# Patient Record
Sex: MALE | Race: WHITE | NOT HISPANIC OR LATINO | Employment: OTHER | URBAN - METROPOLITAN AREA
[De-identification: names, ages, dates, MRNs, and addresses within clinical notes are randomized per-mention and may not be internally consistent; named-entity substitution may affect disease eponyms.]

---

## 2017-09-22 ENCOUNTER — HOSPITAL ENCOUNTER (EMERGENCY)
Facility: HOSPITAL | Age: 47
Discharge: HOME | End: 2017-09-22
Attending: EMERGENCY MEDICINE | Admitting: EMERGENCY MEDICINE

## 2017-09-22 VITALS
WEIGHT: 200 LBS | OXYGEN SATURATION: 98 % | DIASTOLIC BLOOD PRESSURE: 69 MMHG | TEMPERATURE: 96.8 F | RESPIRATION RATE: 16 BRPM | SYSTOLIC BLOOD PRESSURE: 113 MMHG | BODY MASS INDEX: 27.09 KG/M2 | HEIGHT: 72 IN | HEART RATE: 69 BPM

## 2017-09-22 DIAGNOSIS — R51.9 ACUTE NONINTRACTABLE HEADACHE, UNSPECIFIED HEADACHE TYPE: Primary | ICD-10-CM

## 2017-09-22 PROCEDURE — 25010000002 KETOROLAC TROMETHAMINE PER 15 MG: Performed by: NURSE PRACTITIONER

## 2017-09-22 PROCEDURE — 99283 EMERGENCY DEPT VISIT LOW MDM: CPT

## 2017-09-22 PROCEDURE — 96361 HYDRATE IV INFUSION ADD-ON: CPT

## 2017-09-22 PROCEDURE — 96374 THER/PROPH/DIAG INJ IV PUSH: CPT

## 2017-09-22 PROCEDURE — 25010000002 ONDANSETRON PER 1 MG: Performed by: NURSE PRACTITIONER

## 2017-09-22 PROCEDURE — 96375 TX/PRO/DX INJ NEW DRUG ADDON: CPT

## 2017-09-22 RX ORDER — PROMETHAZINE HYDROCHLORIDE 25 MG/1
25 TABLET ORAL EVERY 6 HOURS PRN
Qty: 12 TABLET | Refills: 0 | Status: SHIPPED | OUTPATIENT
Start: 2017-09-22 | End: 2018-05-25

## 2017-09-22 RX ORDER — NAPROXEN 500 MG/1
500 TABLET ORAL 2 TIMES DAILY PRN
Qty: 14 TABLET | Refills: 0 | Status: SHIPPED | OUTPATIENT
Start: 2017-09-22 | End: 2018-05-17 | Stop reason: HOSPADM

## 2017-09-22 RX ORDER — PROMETHAZINE HYDROCHLORIDE 25 MG/ML
12.5 INJECTION, SOLUTION INTRAMUSCULAR; INTRAVENOUS ONCE
Status: DISCONTINUED | OUTPATIENT
Start: 2017-09-22 | End: 2017-09-22

## 2017-09-22 RX ORDER — KETOROLAC TROMETHAMINE 15 MG/ML
15 INJECTION, SOLUTION INTRAMUSCULAR; INTRAVENOUS ONCE
Status: COMPLETED | OUTPATIENT
Start: 2017-09-22 | End: 2017-09-22

## 2017-09-22 RX ORDER — ONDANSETRON 4 MG/1
4 TABLET, FILM COATED ORAL EVERY 6 HOURS PRN
Qty: 12 TABLET | Refills: 0 | Status: SHIPPED | OUTPATIENT
Start: 2017-09-22 | End: 2018-05-25

## 2017-09-22 RX ORDER — ONDANSETRON 2 MG/ML
4 INJECTION INTRAMUSCULAR; INTRAVENOUS ONCE
Status: COMPLETED | OUTPATIENT
Start: 2017-09-22 | End: 2017-09-22

## 2017-09-22 RX ADMIN — ONDANSETRON 4 MG: 2 INJECTION INTRAMUSCULAR; INTRAVENOUS at 16:19

## 2017-09-22 RX ADMIN — SODIUM CHLORIDE 1000 ML: 9 INJECTION, SOLUTION INTRAVENOUS at 16:17

## 2017-09-22 RX ADMIN — KETOROLAC TROMETHAMINE 15 MG: 15 INJECTION, SOLUTION INTRAMUSCULAR; INTRAVENOUS at 16:22

## 2017-09-22 NOTE — ED PROVIDER NOTES
EMERGENCY DEPARTMENT ENCOUNTER    CHIEF COMPLAINT  Chief Complaint: HA  History given by: pt   History limited by: none   Room Number: 03/03  PMD: No Known Provider      HPI:  Pt is a 46 y.o. male who presents with temporal HA that radiated to his anterior head for the past 2 weeks. Pt states that his HA now includes his posterior head as well. Pt also c/o minor nausea while in the waiting room today, and earache for several days. Pt denies vomiting, congestion, confusion, numbness, tingling. Pt states that he has had a high stress level recently. Pt states that he was seen at [facility] and had a CT head with no findings, and benadryl for his HA. Pt states that his b/p has been high recently. Pt denies any changes in his caffeine intake or stimulant use. Pt states a Hx of intermittent HA that spontaneously resolved.     Duration: 2 weeks   Timing: constant   Location: temporal   Radiation: anterior head, posterior head  Quality: pain  Intensity/Severity: moderate   Progression: unchanged   Associated Symptoms: minor nausea, earache  Aggravating Factors: none stated   Alleviating Factors: none stated   Previous Episodes: Hx of intermittent HA  Treatment before arrival: benadryl without relief     PAST MEDICAL HISTORY  Active Ambulatory Problems     Diagnosis Date Noted   • No Active Ambulatory Problems     Resolved Ambulatory Problems     Diagnosis Date Noted   • No Resolved Ambulatory Problems     No Additional Past Medical History       PAST SURGICAL HISTORY  Past Surgical History:   Procedure Laterality Date   • BACK SURGERY         FAMILY HISTORY  History reviewed. No pertinent family history.    SOCIAL HISTORY  Social History     Social History   • Marital status: Single     Spouse name: N/A   • Number of children: N/A   • Years of education: N/A     Occupational History   • Not on file.     Social History Main Topics   • Smoking status: Never Smoker   • Smokeless tobacco: Current User     Types: Chew   • Alcohol  use No   • Drug use: No   • Sexual activity: Defer     Other Topics Concern   • Not on file     Social History Narrative   • No narrative on file         ALLERGIES  Review of patient's allergies indicates no known allergies.    REVIEW OF SYSTEMS  Review of Systems   Constitutional: Negative.  Negative for activity change, appetite change ( decreased), chills and fever.   HENT: Positive for ear pain (several days). Negative for congestion, rhinorrhea, sinus pressure and sore throat.    Eyes: Negative.    Respiratory: Negative.  Negative for cough and shortness of breath.    Cardiovascular: Negative.  Negative for chest pain, palpitations and leg swelling ( pedal).   Gastrointestinal: Positive for nausea (minor, since this afternoon). Negative for abdominal pain, diarrhea and vomiting.   Endocrine: Negative.    Genitourinary: Negative.  Negative for decreased urine volume, difficulty urinating, dysuria, frequency and urgency.   Musculoskeletal: Negative.  Negative for back pain.   Skin: Negative.  Negative for rash.   Allergic/Immunologic: Negative.    Neurological: Positive for headaches. Negative for dizziness, weakness, light-headedness and numbness.   Hematological: Negative.    Psychiatric/Behavioral: Negative.  The patient is not nervous/anxious.    All other systems reviewed and are negative.      PHYSICAL EXAM  ED Triage Vitals   Temp Heart Rate Resp BP SpO2   09/22/17 1137 09/22/17 1137 09/22/17 1139 09/22/17 1215 09/22/17 1137   96.8 °F (36 °C) 72 16 146/75 98 %      Temp src Heart Rate Source Patient Position BP Location FiO2 (%)   09/22/17 1137 09/22/17 1137 09/22/17 1215 09/22/17 1215 --   Tympanic Monitor Sitting Left arm        Physical Exam   Constitutional: He is oriented to person, place, and time and well-developed, well-nourished, and in no distress. No distress.   HENT:   Head: Normocephalic.   Mouth/Throat: Oropharynx is clear and moist and mucous membranes are normal.   Eyes: Pupils are equal,  "round, and reactive to light.   Neck: Normal range of motion.   Cardiovascular: Normal rate, regular rhythm and normal heart sounds.    Pulmonary/Chest: Effort normal and breath sounds normal. He has no wheezes.   Abdominal: Soft. Bowel sounds are normal. There is no tenderness.   Musculoskeletal: Normal range of motion. He exhibits no edema.   Neurological: He is alert and oriented to person, place, and time. He has normal sensation and normal strength.   Skin: Skin is warm and dry. No rash noted.   Psychiatric: Mood, memory, affect and judgment normal.   Nursing note and vitals reviewed.      PROGRESS AND CONSULTS  1655  Reviewed pt's history and workup with Dr. Thomason.  After a bedside evaluation; Dr Thomason agrees with the plan of care.    1737  Rechecked pt, who is resting comfortably, and states that he is feeling better. Plan to d/c the pt with meds for pain to take as needed for HA. Advised the pt not to drive on narcotic pain meds. Pt understands and agrees with the plan, ad all questions were answered.     MEDICATIONS GIVEN IN ER  Medications   ketorolac (TORADOL) injection 15 mg (15 mg Intravenous Given 9/22/17 1622)   sodium chloride 0.9 % bolus 1,000 mL (1,000 mL Intravenous New Bag 9/22/17 1617)   ondansetron (ZOFRAN) injection 4 mg (4 mg Intravenous Given 9/22/17 1619)       /69  Pulse 69  Temp 96.8 °F (36 °C) (Tympanic)   Resp 16  Ht 72\" (182.9 cm)  Wt 200 lb (90.7 kg)  SpO2 98%  BMI 27.12 kg/m2    DISCHARGE  Discussed all results and noted any abnormalities with patient.  Discussed absoute need to recheck abnormalities with [facility].    Reviewed implications of results, diagnosis, meds, responsibility to follow up, warning signs and symptoms of possible worsening, potential complications and reasons to return to ER with patient    Discussed plan for discharge, as there is no emergent indication for admission.  Pt is agreeable and understands need for follow up and repeat " testing.  Pt is aware that discharge does not mean that nothing is wrong but it indicates no emergency is present and they must continue care with [facility].  Pt is discharged with instructions to follow up with primary care doctor to have their blood pressure rechecked.       DIAGNOSIS  Final diagnoses:   Acute nonintractable headache, unspecified headache type       FOLLOW UP   [facility]UR[facility]  [address]  [facility] [address] [phone]  Call in 1 day  As needed      RX     Medication List      New Prescriptions          naproxen 500 MG tablet   Commonly known as:  NAPROSYN   Take 1 tablet by mouth 2 (Two) Times a Day As Needed for Headache.       ondansetron 4 MG tablet   Commonly known as:  ZOFRAN   Take 1 tablet by mouth Every 6 (Six) Hours As Needed for Nausea or   Vomiting.       promethazine 25 MG tablet   Commonly known as:  PHENERGAN   Take 1 tablet by mouth Every 6 (Six) Hours As Needed for Nausea or   Vomiting (do not drive on medication).           I personally reviewed the past medical history, past surgical history, social history, family history, current medications and allergies as they appear in this chart.  The scribe's note accurately reflects the work and decisions made by me.       Documentation assistance provided by edwige Smith for AYLA Desouza.  Information recorded by the scribe was done at my direction and has been verified and validated by me.     Teofilo Smith  09/22/17 1748       AYLA Meneses  09/22/17 1742

## 2017-09-22 NOTE — ED PROVIDER NOTES
Pt reports to the ED complaining of a constant HA onset 2 weeks ago that has improved after recieving medication in the ED. The pt states the pain was a 7/10 at its worst, but is now a 3/10. The pt states the HA is in his posterior lower head and neck. Pt reports mild photophobia. Pt denies family hx of migraines. The pt states he had a normal CT scan several weeks ago.    Physical Exam:  Patient is nontoxic appearing and in NAD. The pt is alert and oriented X 3. The pt's pain is unaffected by applying pressure to the bilateral temporalis muscles.  Lungs/cardiovascular: The pt's heart is RRR without murmur. The pt's lungs are clear to auscultation.    I supervised care provided by the midlevel provider.  We have discussed this patient's history, physical exam, and treatment plan.  I have reviewed the note and personally saw and examined the patient and agree with the plan of care.    Documentation assistance provided by edwige Virk for Dr. Thomason. Information recorded by the scribe was done at my direction and has been verified and validated by me.       Venkat Virk  09/22/17 4244       Sj Thomason MD  09/22/17 8410

## 2018-05-15 LAB
ALBUMIN SERPL-MCNC: 4.4 G/DL (ref 3.5–5.2)
ALBUMIN/GLOB SERPL: 1.7 G/DL
ALP SERPL-CCNC: 84 U/L (ref 39–117)
ALT SERPL W P-5'-P-CCNC: 11 U/L (ref 1–41)
ANION GAP SERPL CALCULATED.3IONS-SCNC: 10.7 MMOL/L
AST SERPL-CCNC: 15 U/L (ref 1–40)
BASOPHILS # BLD AUTO: 0.04 10*3/MM3 (ref 0–0.2)
BASOPHILS NFR BLD AUTO: 0.6 % (ref 0–1.5)
BILIRUB SERPL-MCNC: 0.3 MG/DL (ref 0.1–1.2)
BUN BLD-MCNC: 13 MG/DL (ref 6–20)
BUN/CREAT SERPL: 12.1 (ref 7–25)
CALCIUM SPEC-SCNC: 9.3 MG/DL (ref 8.6–10.5)
CHLORIDE SERPL-SCNC: 102 MMOL/L (ref 98–107)
CO2 SERPL-SCNC: 30.3 MMOL/L (ref 22–29)
CREAT BLD-MCNC: 1.07 MG/DL (ref 0.76–1.27)
DEPRECATED RDW RBC AUTO: 39.6 FL (ref 37–54)
EOSINOPHIL # BLD AUTO: 0.13 10*3/MM3 (ref 0–0.7)
EOSINOPHIL NFR BLD AUTO: 1.8 % (ref 0.3–6.2)
ERYTHROCYTE [DISTWIDTH] IN BLOOD BY AUTOMATED COUNT: 11.9 % (ref 11.5–14.5)
GFR SERPL CREATININE-BSD FRML MDRD: 75 ML/MIN/1.73
GLOBULIN UR ELPH-MCNC: 2.6 GM/DL
GLUCOSE BLD-MCNC: 95 MG/DL (ref 65–99)
HCT VFR BLD AUTO: 41.5 % (ref 40.4–52.2)
HGB BLD-MCNC: 13.6 G/DL (ref 13.7–17.6)
IMM GRANULOCYTES # BLD: 0.02 10*3/MM3 (ref 0–0.03)
IMM GRANULOCYTES NFR BLD: 0.3 % (ref 0–0.5)
LIPASE SERPL-CCNC: 25 U/L (ref 13–60)
LYMPHOCYTES # BLD AUTO: 2.15 10*3/MM3 (ref 0.9–4.8)
LYMPHOCYTES NFR BLD AUTO: 29.9 % (ref 19.6–45.3)
MCH RBC QN AUTO: 29.8 PG (ref 27–32.7)
MCHC RBC AUTO-ENTMCNC: 32.8 G/DL (ref 32.6–36.4)
MCV RBC AUTO: 91 FL (ref 79.8–96.2)
MONOCYTES # BLD AUTO: 0.62 10*3/MM3 (ref 0.2–1.2)
MONOCYTES NFR BLD AUTO: 8.6 % (ref 5–12)
NEUTROPHILS # BLD AUTO: 4.25 10*3/MM3 (ref 1.9–8.1)
NEUTROPHILS NFR BLD AUTO: 59.1 % (ref 42.7–76)
PLATELET # BLD AUTO: 304 10*3/MM3 (ref 140–500)
PMV BLD AUTO: 10.2 FL (ref 6–12)
POTASSIUM BLD-SCNC: 4.3 MMOL/L (ref 3.5–5.2)
PROT SERPL-MCNC: 7 G/DL (ref 6–8.5)
RBC # BLD AUTO: 4.56 10*6/MM3 (ref 4.6–6)
SODIUM BLD-SCNC: 143 MMOL/L (ref 136–145)
WBC NRBC COR # BLD: 7.19 10*3/MM3 (ref 4.5–10.7)

## 2018-05-15 PROCEDURE — 84439 ASSAY OF FREE THYROXINE: CPT | Performed by: EMERGENCY MEDICINE

## 2018-05-15 PROCEDURE — 36415 COLL VENOUS BLD VENIPUNCTURE: CPT

## 2018-05-15 PROCEDURE — 84443 ASSAY THYROID STIM HORMONE: CPT | Performed by: EMERGENCY MEDICINE

## 2018-05-15 PROCEDURE — 83690 ASSAY OF LIPASE: CPT

## 2018-05-15 PROCEDURE — 80053 COMPREHEN METABOLIC PANEL: CPT

## 2018-05-15 PROCEDURE — 99284 EMERGENCY DEPT VISIT MOD MDM: CPT

## 2018-05-15 PROCEDURE — 85025 COMPLETE CBC W/AUTO DIFF WBC: CPT

## 2018-05-15 PROCEDURE — 82533 TOTAL CORTISOL: CPT | Performed by: INTERNAL MEDICINE

## 2018-05-15 RX ORDER — SODIUM CHLORIDE 0.9 % (FLUSH) 0.9 %
10 SYRINGE (ML) INJECTION AS NEEDED
Status: DISCONTINUED | OUTPATIENT
Start: 2018-05-15 | End: 2018-05-17 | Stop reason: HOSPADM

## 2018-05-16 ENCOUNTER — ON CAMPUS - OUTPATIENT (OUTPATIENT)
Dept: URBAN - METROPOLITAN AREA HOSPITAL 114 | Facility: HOSPITAL | Age: 48
End: 2018-05-16

## 2018-05-16 ENCOUNTER — HOSPITAL ENCOUNTER (OUTPATIENT)
Facility: HOSPITAL | Age: 48
Setting detail: OBSERVATION
LOS: 1 days | Discharge: HOME | End: 2018-05-17
Attending: EMERGENCY MEDICINE | Admitting: HOSPITALIST

## 2018-05-16 ENCOUNTER — APPOINTMENT (OUTPATIENT)
Dept: CT IMAGING | Facility: HOSPITAL | Age: 48
End: 2018-05-16

## 2018-05-16 ENCOUNTER — ANESTHESIA (OUTPATIENT)
Dept: GASTROENTEROLOGY | Facility: HOSPITAL | Age: 48
End: 2018-05-16

## 2018-05-16 ENCOUNTER — ANESTHESIA EVENT (OUTPATIENT)
Dept: GASTROENTEROLOGY | Facility: HOSPITAL | Age: 48
End: 2018-05-16

## 2018-05-16 DIAGNOSIS — K44.9 DIAPHRAGMATIC HERNIA WITHOUT OBSTRUCTION OR GANGRENE: ICD-10-CM

## 2018-05-16 DIAGNOSIS — K92.0 HEMATEMESIS: ICD-10-CM

## 2018-05-16 DIAGNOSIS — K92.2 UPPER GI BLEEDING: Primary | ICD-10-CM

## 2018-05-16 DIAGNOSIS — R13.10 DYSPHAGIA, UNSPECIFIED: ICD-10-CM

## 2018-05-16 DIAGNOSIS — K30 FUNCTIONAL DYSPEPSIA: ICD-10-CM

## 2018-05-16 DIAGNOSIS — R63.4 ABNORMAL WEIGHT LOSS: ICD-10-CM

## 2018-05-16 PROBLEM — F41.9 ANXIETY: Status: ACTIVE | Noted: 2018-05-16

## 2018-05-16 PROBLEM — K59.00 CONSTIPATION: Status: ACTIVE | Noted: 2018-05-16

## 2018-05-16 LAB
AMPHET+METHAMPHET UR QL: NEGATIVE
BACTERIA UR QL AUTO: NORMAL /HPF
BARBITURATES UR QL SCN: NEGATIVE
BENZODIAZ UR QL SCN: NEGATIVE
BILIRUB UR QL STRIP: NEGATIVE
CANNABINOIDS SERPL QL: NEGATIVE
CLARITY UR: CLEAR
COCAINE UR QL: NEGATIVE
COLOR UR: YELLOW
CORTIS SERPL-MCNC: 20.18 MCG/DL
CORTIS SERPL-MCNC: 3.42 MCG/DL
CORTIS SERPL-MCNC: 4.84 MCG/DL
GLUCOSE UR STRIP-MCNC: NEGATIVE MG/DL
HCT VFR BLD AUTO: 37.4 % (ref 40.4–52.2)
HCT VFR BLD AUTO: 39.8 % (ref 40.4–52.2)
HGB BLD-MCNC: 12.3 G/DL (ref 13.7–17.6)
HGB BLD-MCNC: 13.3 G/DL (ref 13.7–17.6)
HGB UR QL STRIP.AUTO: ABNORMAL
HOLD SPECIMEN: NORMAL
HOLD SPECIMEN: NORMAL
HYALINE CASTS UR QL AUTO: NORMAL /LPF
KETONES UR QL STRIP: NEGATIVE
LEUKOCYTE ESTERASE UR QL STRIP.AUTO: NEGATIVE
METHADONE UR QL SCN: NEGATIVE
NITRITE UR QL STRIP: NEGATIVE
OPIATES UR QL: NEGATIVE
OXYCODONE UR QL SCN: NEGATIVE
PH UR STRIP.AUTO: 5.5 [PH] (ref 5–8)
PROT UR QL STRIP: NEGATIVE
RBC # UR: NORMAL /HPF
REF LAB TEST METHOD: NORMAL
SP GR UR STRIP: 1.02 (ref 1–1.03)
SQUAMOUS #/AREA URNS HPF: NORMAL /HPF
T4 FREE SERPL-MCNC: 1.33 NG/DL (ref 0.93–1.7)
TSH SERPL DL<=0.05 MIU/L-ACNC: 3.01 MIU/ML (ref 0.27–4.2)
URINE MYOGLOBIN, QUALITATIVE: POSITIVE
UROBILINOGEN UR QL STRIP: ABNORMAL
WBC UR QL AUTO: NORMAL /HPF
WHOLE BLOOD HOLD SPECIMEN: NORMAL
WHOLE BLOOD HOLD SPECIMEN: NORMAL

## 2018-05-16 PROCEDURE — 99204 OFFICE O/P NEW MOD 45 MIN: CPT | Performed by: INTERNAL MEDICINE

## 2018-05-16 PROCEDURE — G0378 HOSPITAL OBSERVATION PER HR: HCPCS

## 2018-05-16 PROCEDURE — 80307 DRUG TEST PRSMV CHEM ANLYZR: CPT | Performed by: HOSPITALIST

## 2018-05-16 PROCEDURE — 96365 THER/PROPH/DIAG IV INF INIT: CPT

## 2018-05-16 PROCEDURE — 96375 TX/PRO/DX INJ NEW DRUG ADDON: CPT

## 2018-05-16 PROCEDURE — 82533 TOTAL CORTISOL: CPT | Performed by: HOSPITALIST

## 2018-05-16 PROCEDURE — 88312 SPECIAL STAINS GROUP 1: CPT | Performed by: INTERNAL MEDICINE

## 2018-05-16 PROCEDURE — 43239 EGD BIOPSY SINGLE/MULTIPLE: CPT | Performed by: INTERNAL MEDICINE

## 2018-05-16 PROCEDURE — 96376 TX/PRO/DX INJ SAME DRUG ADON: CPT

## 2018-05-16 PROCEDURE — 83874 ASSAY OF MYOGLOBIN: CPT | Performed by: INTERNAL MEDICINE

## 2018-05-16 PROCEDURE — 81001 URINALYSIS AUTO W/SCOPE: CPT | Performed by: EMERGENCY MEDICINE

## 2018-05-16 PROCEDURE — 25010000002 COSYNTROPIN PER 0.25 MG: Performed by: HOSPITALIST

## 2018-05-16 PROCEDURE — 88305 TISSUE EXAM BY PATHOLOGIST: CPT | Performed by: INTERNAL MEDICINE

## 2018-05-16 PROCEDURE — 25010000002 LORAZEPAM PER 2 MG: Performed by: EMERGENCY MEDICINE

## 2018-05-16 PROCEDURE — 96366 THER/PROPH/DIAG IV INF ADDON: CPT

## 2018-05-16 PROCEDURE — 25010000002 PROPOFOL 1000 MG/ML EMULSION: Performed by: ANESTHESIOLOGY

## 2018-05-16 PROCEDURE — 96374 THER/PROPH/DIAG INJ IV PUSH: CPT

## 2018-05-16 PROCEDURE — 25010000002 PROPOFOL 10 MG/ML EMULSION: Performed by: ANESTHESIOLOGY

## 2018-05-16 PROCEDURE — 85014 HEMATOCRIT: CPT | Performed by: EMERGENCY MEDICINE

## 2018-05-16 PROCEDURE — 25010000002 IOPAMIDOL 61 % SOLUTION: Performed by: EMERGENCY MEDICINE

## 2018-05-16 PROCEDURE — 85018 HEMOGLOBIN: CPT | Performed by: EMERGENCY MEDICINE

## 2018-05-16 PROCEDURE — 74177 CT ABD & PELVIS W/CONTRAST: CPT

## 2018-05-16 RX ORDER — FAMOTIDINE 10 MG/ML
20 INJECTION, SOLUTION INTRAVENOUS ONCE
Status: COMPLETED | OUTPATIENT
Start: 2018-05-16 | End: 2018-05-16

## 2018-05-16 RX ORDER — LIDOCAINE HYDROCHLORIDE 20 MG/ML
INJECTION, SOLUTION INFILTRATION; PERINEURAL AS NEEDED
Status: DISCONTINUED | OUTPATIENT
Start: 2018-05-16 | End: 2018-05-16 | Stop reason: SURG

## 2018-05-16 RX ORDER — LIDOCAINE HYDROCHLORIDE 10 MG/ML
0.5 INJECTION, SOLUTION INFILTRATION; PERINEURAL ONCE AS NEEDED
Status: DISCONTINUED | OUTPATIENT
Start: 2018-05-16 | End: 2018-05-16

## 2018-05-16 RX ORDER — ONDANSETRON 4 MG/1
4 TABLET, ORALLY DISINTEGRATING ORAL EVERY 6 HOURS PRN
Status: DISCONTINUED | OUTPATIENT
Start: 2018-05-16 | End: 2018-05-17 | Stop reason: HOSPADM

## 2018-05-16 RX ORDER — COSYNTROPIN 0.25 MG/ML
0.25 INJECTION, POWDER, FOR SOLUTION INTRAMUSCULAR; INTRAVENOUS ONCE
Status: COMPLETED | OUTPATIENT
Start: 2018-05-16 | End: 2018-05-16

## 2018-05-16 RX ORDER — LORAZEPAM 2 MG/ML
0.5 INJECTION INTRAMUSCULAR ONCE
Status: COMPLETED | OUTPATIENT
Start: 2018-05-16 | End: 2018-05-16

## 2018-05-16 RX ORDER — SODIUM CHLORIDE 9 MG/ML
125 INJECTION, SOLUTION INTRAVENOUS CONTINUOUS
Status: DISCONTINUED | OUTPATIENT
Start: 2018-05-16 | End: 2018-05-17 | Stop reason: HOSPADM

## 2018-05-16 RX ORDER — PANTOPRAZOLE SODIUM 40 MG/1
40 TABLET, DELAYED RELEASE ORAL
Status: DISCONTINUED | OUTPATIENT
Start: 2018-05-16 | End: 2018-05-17 | Stop reason: HOSPADM

## 2018-05-16 RX ORDER — SODIUM CHLORIDE 9 MG/ML
30 INJECTION, SOLUTION INTRAVENOUS CONTINUOUS
Status: DISCONTINUED | OUTPATIENT
Start: 2018-05-16 | End: 2018-05-16

## 2018-05-16 RX ORDER — SODIUM CHLORIDE 0.9 % (FLUSH) 0.9 %
3 SYRINGE (ML) INJECTION AS NEEDED
Status: DISCONTINUED | OUTPATIENT
Start: 2018-05-16 | End: 2018-05-16

## 2018-05-16 RX ORDER — ACETAMINOPHEN 325 MG/1
650 TABLET ORAL EVERY 4 HOURS PRN
Status: DISCONTINUED | OUTPATIENT
Start: 2018-05-16 | End: 2018-05-17 | Stop reason: HOSPADM

## 2018-05-16 RX ORDER — PROPOFOL 10 MG/ML
VIAL (ML) INTRAVENOUS AS NEEDED
Status: DISCONTINUED | OUTPATIENT
Start: 2018-05-16 | End: 2018-05-16 | Stop reason: SURG

## 2018-05-16 RX ORDER — ONDANSETRON 2 MG/ML
4 INJECTION INTRAMUSCULAR; INTRAVENOUS EVERY 6 HOURS PRN
Status: DISCONTINUED | OUTPATIENT
Start: 2018-05-16 | End: 2018-05-17 | Stop reason: HOSPADM

## 2018-05-16 RX ORDER — PANTOPRAZOLE SODIUM 40 MG/10ML
80 INJECTION, POWDER, LYOPHILIZED, FOR SOLUTION INTRAVENOUS ONCE
Status: COMPLETED | OUTPATIENT
Start: 2018-05-16 | End: 2018-05-16

## 2018-05-16 RX ORDER — SODIUM CHLORIDE 0.9 % (FLUSH) 0.9 %
1-10 SYRINGE (ML) INJECTION AS NEEDED
Status: DISCONTINUED | OUTPATIENT
Start: 2018-05-16 | End: 2018-05-17 | Stop reason: HOSPADM

## 2018-05-16 RX ORDER — SODIUM CHLORIDE, SODIUM LACTATE, POTASSIUM CHLORIDE, CALCIUM CHLORIDE 600; 310; 30; 20 MG/100ML; MG/100ML; MG/100ML; MG/100ML
1000 INJECTION, SOLUTION INTRAVENOUS CONTINUOUS
Status: DISCONTINUED | OUTPATIENT
Start: 2018-05-16 | End: 2018-05-16

## 2018-05-16 RX ORDER — ALPRAZOLAM 0.5 MG/1
0.5 TABLET ORAL ONCE
Status: COMPLETED | OUTPATIENT
Start: 2018-05-16 | End: 2018-05-16

## 2018-05-16 RX ORDER — ONDANSETRON 4 MG/1
4 TABLET, FILM COATED ORAL EVERY 6 HOURS PRN
Status: DISCONTINUED | OUTPATIENT
Start: 2018-05-16 | End: 2018-05-17 | Stop reason: HOSPADM

## 2018-05-16 RX ADMIN — LORAZEPAM 0.5 MG: 2 INJECTION INTRAMUSCULAR; INTRAVENOUS at 00:49

## 2018-05-16 RX ADMIN — PROPOFOL 170 MG: 10 INJECTION, EMULSION INTRAVENOUS at 13:04

## 2018-05-16 RX ADMIN — SODIUM CHLORIDE 30 ML/HR: 9 INJECTION, SOLUTION INTRAVENOUS at 12:56

## 2018-05-16 RX ADMIN — PROPOFOL 180 MCG/KG/MIN: 10 INJECTION, EMULSION INTRAVENOUS at 13:04

## 2018-05-16 RX ADMIN — SODIUM CHLORIDE, POTASSIUM CHLORIDE, SODIUM LACTATE AND CALCIUM CHLORIDE: 600; 310; 30; 20 INJECTION, SOLUTION INTRAVENOUS at 12:45

## 2018-05-16 RX ADMIN — LORAZEPAM 0.5 MG: 2 INJECTION INTRAMUSCULAR; INTRAVENOUS at 05:45

## 2018-05-16 RX ADMIN — SODIUM CHLORIDE 8 MG/HR: 900 INJECTION INTRAVENOUS at 11:20

## 2018-05-16 RX ADMIN — ALPRAZOLAM 0.5 MG: 0.5 TABLET ORAL at 21:10

## 2018-05-16 RX ADMIN — LIDOCAINE HYDROCHLORIDE 60 MG: 20 INJECTION, SOLUTION INFILTRATION; PERINEURAL at 13:04

## 2018-05-16 RX ADMIN — ACETAMINOPHEN 650 MG: 325 TABLET ORAL at 21:15

## 2018-05-16 RX ADMIN — SODIUM CHLORIDE 125 ML/HR: 9 INJECTION, SOLUTION INTRAVENOUS at 00:29

## 2018-05-16 RX ADMIN — SODIUM CHLORIDE 125 ML/HR: 9 INJECTION, SOLUTION INTRAVENOUS at 08:40

## 2018-05-16 RX ADMIN — PANTOPRAZOLE SODIUM 40 MG: 40 TABLET, DELAYED RELEASE ORAL at 17:21

## 2018-05-16 RX ADMIN — COSYNTROPIN 0.25 MG: 0.25 INJECTION, POWDER, FOR SOLUTION INTRAMUSCULAR; INTRAVENOUS at 17:07

## 2018-05-16 RX ADMIN — SODIUM CHLORIDE 8 MG/HR: 900 INJECTION INTRAVENOUS at 06:01

## 2018-05-16 RX ADMIN — FAMOTIDINE 20 MG: 10 INJECTION, SOLUTION INTRAVENOUS at 00:34

## 2018-05-16 RX ADMIN — IOPAMIDOL 85 ML: 612 INJECTION, SOLUTION INTRAVENOUS at 00:56

## 2018-05-16 RX ADMIN — PANTOPRAZOLE SODIUM 80 MG: 40 INJECTION, POWDER, FOR SOLUTION INTRAVENOUS at 05:48

## 2018-05-16 NOTE — CONSULTS
Inpatient Gastroenterology Consult  Consult performed by: MISSY LEAVITT  Consult ordered by: JOSE MOURA          Patient Care Team:  AYAL Koch as PCP - General (Family Medicine)    Chief complaint:hematemesis weight loss    Subjective     History of Present Illness  Gentleman presents with upset stomach,  Nausea, coffee ground emesis,  Upper abdominal pain.  He has had considerable anxiety lately.  No bleeding.  Has lost 20 lbs over past year.    Review of Systems   Constitutional: Positive for fatigue and unexpected weight change.   Psychiatric/Behavioral: The patient is nervous/anxious.    All other systems reviewed and are negative.       History reviewed. No pertinent past medical history.,   Past Surgical History:   Procedure Laterality Date   • BACK SURGERY     , History reviewed. No pertinent family history.,   Social History   Substance Use Topics   • Smoking status: Never Smoker   • Smokeless tobacco: Current User     Types: Chew   • Alcohol use No   ,   Prescriptions Prior to Admission   Medication Sig Dispense Refill Last Dose   • naproxen (NAPROSYN) 500 MG tablet Take 1 tablet by mouth 2 (Two) Times a Day As Needed for Headache. 14 tablet 0    • ondansetron (ZOFRAN) 4 MG tablet Take 1 tablet by mouth Every 6 (Six) Hours As Needed for Nausea or Vomiting. 12 tablet 0    • promethazine (PHENERGAN) 25 MG tablet Take 1 tablet by mouth Every 6 (Six) Hours As Needed for Nausea or Vomiting (do not drive on medication). 12 tablet 0    , Scheduled Meds:   , Continuous Infusions:    pantoprazole 8 mg/hr Last Rate: 8 mg/hr (05/16/18 1120)   sodium chloride 125 mL/hr Last Rate: 125 mL/hr (05/16/18 0840)   , PRN Meds:  •  acetaminophen  •  ondansetron **OR** ondansetron ODT **OR** ondansetron  •  sodium chloride  •  sodium chloride and Allergies:  Metformin and related    Objective      Vital Signs  Temp:  [96.4 °F (35.8 °C)-97.9 °F (36.6 °C)] 97.9 °F (36.6 °C)  Heart Rate:  [55-98] 58  Resp:   [16-17] 16  BP: ()/(60-83) 115/60    Physical Exam   Constitutional: He appears well-developed and well-nourished.   HENT:   Mouth/Throat: Oropharynx is clear and moist.   Eyes: Conjunctivae are normal.   Neck: Neck supple.   Cardiovascular: Normal rate and regular rhythm.    Pulmonary/Chest: Effort normal and breath sounds normal.   Abdominal: Soft. Bowel sounds are normal.   Neurological: He is alert.   Skin: Skin is warm and dry.   Psychiatric: He has a normal mood and affect.       Results Review:    I reviewed the patient's new clinical results.        Assessment/Plan     Principal Problem:    Upper GI bleeding  Active Problems:    Weight loss    Constipation    Anxiety      Assessment:  (Hematemesis  Dyspepsia,  Weight loss).     Plan:   (Upper  tract endoscopy, risks, alternatives and benefits dicussed  with patient and patient is agreeable to proceed.  Will check a cortisol level given weigith loss and bronze skin ).       I discussed the patients findings and my recommendations with patient, family and nursing staff    Mike Monroy MD  05/16/18  11:32 AM    Time: Critical care 12 min

## 2018-05-16 NOTE — PROGRESS NOTES
Malnutrition Severity Assessment    Patient Name:  Rinku Bray  YOB: 1970  MRN: [id number]  Admit Date:  5/16/2018    Patient meets criteria for : Moderate malnutrition    Moderate malnutrition. 20% weight loss in the past year. 71% UBW.  From nutrition focused physical exam, mild muscle loss to temporalis, clavicle and acromion bone region.    Malnutrition Type: Social/Environmental Circumstance Malnutrition     Malnutrition Type (last 8 hours)      Malnutrition Severity Assessment     Row Name 05/16/18 1447       Malnutrition Severity Assessment    Malnutrition Type Social/Environmental Circumstance Malnutrition    Row Name 05/16/18 1447       Physical Signs of Malnutrition (Social/Environmental)    Muscle Wasting Mild   temporalis    Row Name 05/16/18 1447       Weight Status (Social/Environmental)    %UBW Severe (<75%)   71%    Weight Loss Mod (20% / 1 yr)    Row Name 05/16/18 1447       Energy Intake Status (Social/Environmental)    Energy Intake Mod (<75% / > or equal to 3 mo)    Row Name 05/16/18 1447       Criteria Met (Must meet criteria for severity in at least 2 of these categories: M Wasting, Fat Loss, Fluid, Secondary Signs, Wt. Status, Intake)    Patient meets criteria for  Moderate malnutrition          Electronically signed by:  Glenna Tam RD  05/16/18 2:54 PM

## 2018-05-16 NOTE — ANESTHESIA POSTPROCEDURE EVALUATION
"Patient: Trace Hasting    Procedure Summary     Date:  05/16/18 Room / Location:   [facility]    Anesthesia Start:  1300 Anesthesia Stop:  1314    Procedure:  ESOPHAGOGASTRODUODENOSCOPY WITH COLD BIOPSIES (N/A Esophagus) Diagnosis:       Upper GI bleeding      (Upper GI bleeding [K92.2])    Surgeon:  Mike Monroy MD Provider:  Yumiko Wheatley MD    Anesthesia Type:  MAC ASA Status:  2          Anesthesia Type: MAC  Last vitals  BP   104/62 (05/16/18 1317)   Temp   36.8 °C (98.3 °F) (05/16/18 1251)   Pulse   64 (05/16/18 1317)   Resp   16 (05/16/18 1317)     SpO2   97 % (05/16/18 1317)     Post Anesthesia Care and Evaluation    Patient location during evaluation: bedside  Patient participation: complete - patient participated  Level of consciousness: awake and alert  Pain management: adequate  Airway patency: patent  Anesthetic complications: No anesthetic complications  PONV Status: none  Cardiovascular status: acceptable  Respiratory status: acceptable  Hydration status: acceptable    Comments: /62 (BP Location: Left arm, Patient Position: Lying)   Pulse 64   Temp 36.8 °C (98.3 °F) (Oral)   Resp 16   Ht 180.3 cm (71\")   Wt 84.8 kg (187 lb)   SpO2 97%   BMI 26.08 kg/m²         "

## 2018-05-16 NOTE — CONSULTS
Adult Nutrition  Assessment/PES    Patient Name:  Rinku Bray  YOB: 1970  MRN: [id number]  Admit Date:  5/16/2018    Assessment Date:  5/16/2018    Consult received. Nutrition assessment completed. Patient reported about 70# in the past year. Patient has been dealing with abdominal pain for the past several weeks which related to decreased po intake. Pt also c/o of having what feels like panic attacks recently due to increased stressed and unintentional weight loss over the past year. Moderate malnutrition.  Provided nutrition therapy-patient currently on full liquids, agreed to nutritional supplements BID.  Will follow.    Reason for Assessment   Reason For Assessment nurse/nurse practitioner consult;identified at risk by screening criteria   Diagnosis   Primary Problem:  Upper GI bleeding    Identified At Risk by Screening Criteria MST SCORE 2+;reduced oral intake over the last month           Adult Nutrition Assessment     Row Name 05/16/18 1447       Calculation Measurements    Weight Used For Calculations 84.8 kg (186 lb 15.2 oz)       Estimated/Assessed Needs    Additional Documentation KCAL/KG (Group);Protein Requirements (Group);Fluid Requirements (Group)       KCAL/KG                                                      kcal/kg (Specify)   9222-1850       Providence-St. Jeor Equation    RMR (Providence-St. Jeor Equation) 1755.13       Protein Requirements    Est Protein Requirement Amount (gms/kg) 1.2 gm protein    Estimated Protein Requirements (gms/day) 101.76       Fluid Requirements    Estimated Fluid Requirements (mL/day) 2100    RDA Method (mL) 2100    [place]-Segar Method (over 20 kg) 3196       Nutrition Prescription PO    Current PO Diet Full Liquid       Malnutrition Severity Assessment    Malnutrition Type Social/Environmental Circumstance Malnutrition       Physical Signs of Malnutrition (Social/Environmental)    Muscle Wasting Mild   temporalis       Weight Status  (Social/Environmental)    %UBW Severe (<75%)   71%    Weight Loss Mod (20% / 1 yr)    Energy Intake Mod (<75% / > or equal to 3 mo)       Criteria Met (Must meet criteria for severity in at least 2 of these categories: M Wasting, Fat Loss, Fluid, Secondary Signs, Wt. Status, Intake)    Patient meets criteria for  Moderate malnutrition    Row Name 05/16/18 1446       Body Mass Index (BMI)    BMI Assessment BMI 25-29.9: overweight       Labs/Procedures/Meds    Lab Results Reviewed reviewed, pertinent    Lab Results Comments Meds-PPI, NaCl       Physical Findings    Overall Physical Appearance loss of muscle mass   B=22    Row Name 05/16/18 1445                                Problem/Interventions:        Problem 1     Row Name 05/16/18 1450       Nutrition Diagnoses Problem 1    Problem 1 Malnutrition    Etiology (related to) MNT for Treatment/Condition    Signs/Symptoms (evidenced by) Report of Mnimal PO Intake;Unintended Weight Change    Unintended Weight Change Loss    Number of Pounds Lost 70#    Weight loss time period in the past year                    Intervention Goal     Row Name 05/16/18 1459       Intervention Goal    General Maintain nutrition;Meet nutritional needs for age/condition    PO Tolerate PO;Increase intake    Weight Maintain weight            Nutrition Intervention     Row Name 05/16/18 1451       Nutrition Intervention    RD/Tech Action Interview for preference;Follow Tx progress;Care plan reviewd;Encourage intake;Recommend/ordered    Recommended/Ordered Supplement            Nutrition Prescription     Row Name 05/16/18 1453       Nutrition Prescription PO    PO Prescription Begin/change supplement    Supplement [place] Breakfast Essentials;Milkshake;PRO powder    Supplement Frequency 2 times a day    New PO Prescription Ordered? Yes            Education/Evaluation     Row Name 05/16/18 0206       Education    Education Will Instruct as appropriate       Monitor/Evaluation    Monitor Per  protocol    Education Follow-up Reinforce PRN        Electronically signed by:  Glenna Tam RD  05/16/18 2:51 PM

## 2018-05-16 NOTE — H&P
HISTORY AND PHYSICAL   [facility]        Patient Identification:  Name: Rinku Bray  Age: 47 y.o.  Sex: male  :  1970  MRN: [id number]                     Primary Care Physician: AYLA Mendoza    Chief Complaint:  Abdominal pain, vomiting.    History of Present Illness:   Pleasant 47-year-old gentleman states he's been having generalized abdominal pain for several weeks now.  Last few days it is localized to the epigastric area.  He states that yesterday he began to develop nausea and vomiting.  The vomitus was dark, possibly black with coffee-ground type material noted in it.  He has not vomited since approximately noon yesterday.  There've been no changes in his bowel movements.  He is chronically constipated.  Naprosyn is on his medication list but he states that actually he has not been taking any medications recently.  No fever sweats or chills.  No lightheadedness.        Past Medical History:  History reviewed. No pertinent past medical history.  Past Surgical History:  Past Surgical History:   Procedure Laterality Date   • BACK SURGERY        Home Meds:  Prescriptions Prior to Admission   Medication Sig Dispense Refill Last Dose   • naproxen (NAPROSYN) 500 MG tablet Take 1 tablet by mouth 2 (Two) Times a Day As Needed for Headache. 14 tablet 0    • ondansetron (ZOFRAN) 4 MG tablet Take 1 tablet by mouth Every 6 (Six) Hours As Needed for Nausea or Vomiting. 12 tablet 0    • promethazine (PHENERGAN) 25 MG tablet Take 1 tablet by mouth Every 6 (Six) Hours As Needed for Nausea or Vomiting (do not drive on medication). 12 tablet 0        Allergies:  Allergies   Allergen Reactions   • Metformin And Related GI Intolerance     INCREASED CREATININE     Immunizations:    There is no immunization history on file for this patient.  Social History:   Social History     Social History Narrative   • No narrative on file     Social History   Substance Use Topics   • Smoking status: Never  Smoker   • Smokeless tobacco: Current User     Types: Chew   • Alcohol use No     Family History:  History reviewed. No pertinent family history.     Review of Systems  Review of Systems   Constitutional:        Patient reports an 80 pound unintentional weight loss over the last year.  According to available records he is only 13 pounds less than he was 8 months ago.  He does note that his appetite has been declining that he believes his oral intake is been pretty close to what it was.   HENT: Negative.    Eyes: Negative.    Respiratory: Negative.    Cardiovascular: Negative.    Gastrointestinal:        As per history of present illness.  Otherwise negative.   Endocrine: Negative.    Genitourinary: Negative.    Musculoskeletal: Negative.    Skin: Negative.    Allergic/Immunologic: Negative.    Neurological: Negative.    Hematological: Negative.    Psychiatric/Behavioral:        Was complaining of anxiety in emergency room.  Past medical history does show significant history of depression and anxiety and has been on a number of psychiatric medications in the past.       Objective:  tMax 24 hrs: Temp (24hrs), Av °F (36.1 °C), Min:96.4 °F (35.8 °C), Max:97.5 °F (36.4 °C)    Vitals Ranges:   Temp:  [96.4 °F (35.8 °C)-97.5 °F (36.4 °C)] 97.5 °F (36.4 °C)  Heart Rate:  [55-98] 58  Resp:  [16-17] 16  BP: ()/(62-83) 115/76      Exam:  Physical Exam   Constitutional: He is oriented to person, place, and time. He appears well-developed and well-nourished. No distress.   HENT:   Head: Normocephalic and atraumatic.   Right Ear: External ear normal.   Left Ear: External ear normal.   Nose: Nose normal.   Mouth/Throat: Oropharynx is clear and moist.   Eyes: Conjunctivae and EOM are normal. Right eye exhibits no discharge. Left eye exhibits no discharge. No scleral icterus.   Neck: Neck supple. No tracheal deviation present. No thyromegaly present.   Cardiovascular: Normal rate, regular rhythm, normal heart sounds and  intact distal pulses.    Pulmonary/Chest: Effort normal and breath sounds normal. No stridor. No respiratory distress. He exhibits no tenderness.   Abdominal: Soft. Bowel sounds are normal. He exhibits no distension and no mass. There is no tenderness. There is no rebound and no guarding.   Musculoskeletal: He exhibits no edema or deformity.   Neurological: He is alert and oriented to person, place, and time.   Skin: Skin is warm and dry. He is not diaphoretic.   Patient is very well tanned.  I do not see the stria and loose skin folds one would expect with an 80 pound weight loss in 1 year period of time.   Psychiatric: He has a normal mood and affect. His behavior is normal. Judgment and thought content normal.       Data Review:  All labs and radiology reviewed.    Assessment:  Principal Problem:    Upper GI bleeding  Active Problems:    Weight loss    Constipation    Anxiety      Plan:  Patient has been admitted and started on a PPI drip.  We will get gastroenterology involved.  I'll go ahead and leave him nothing by mouth for the time being.  I'll check a TSH with morning labs and see if I get some more information on his previous weights.  For full details please see orders.    Valente Ware MD  5/16/2018  9:13 AM    EMR Dragon/Transcription disclaimer:   Much of this encounter note is an electronic transcription/translation of spoken language to printed text. The electronic translation of spoken language may permit erroneous, or at times, nonsensical words or phrases to be inadvertently transcribed; Although I have reviewed the note for such errors, some may still exist.

## 2018-05-16 NOTE — ED PROVIDER NOTES
" EMERGENCY DEPARTMENT ENCOUNTER    CHIEF COMPLAINT  Chief Complaint: abd pain and coffee ground emesis  History given by: patient  History limited by: nothing  Room Number: 05/05  PMD: AYLA Mendoza      HPI:  Pt is a 47 y.o. male who presents complaining of intermittent abd pain for 2 weeks and coffee ground emesis this AM. Pt reports his abd pain began as generalized but progressively moved to his epigastric region. Pt also c/o having what feels like panic attacks recently due to increased stressed and unintentional weight loss over the past year. He denies fever, urinary symptoms, hair loss, or heat/cold intolerance. Family also reports pt came off a round of antibiotics.    Duration:  2 weeks  Onset: gradual  Timing: intermittent  Location: epigastric  Radiation: none  Quality: \"pain\", coffee ground emesis  Intensity/Severity: moderate  Progression: unchanged  Associated Symptoms: anxiety, weight loss  Aggravating Factors: none  Alleviating Factors: none  Previous Episodes: Pt does not report previous episode.  Treatment before arrival: Pt reports no treatment PTA.    PAST MEDICAL HISTORY  Active Ambulatory Problems     Diagnosis Date Noted   • No Active Ambulatory Problems     Resolved Ambulatory Problems     Diagnosis Date Noted   • No Resolved Ambulatory Problems     No Additional Past Medical History       PAST SURGICAL HISTORY  Past Surgical History:   Procedure Laterality Date   • BACK SURGERY         FAMILY HISTORY  History reviewed. No pertinent family history.    SOCIAL HISTORY  Social History     Social History   • Marital status: Single     Spouse name: N/A   • Number of children: N/A   • Years of education: N/A     Occupational History   • Not on file.     Social History Main Topics   • Smoking status: Never Smoker   • Smokeless tobacco: Current User     Types: Chew   • Alcohol use No   • Drug use: No   • Sexual activity: Defer     Other Topics Concern   • Not on file     Social History " Narrative   • No narrative on file       ALLERGIES  Metformin and related    REVIEW OF SYSTEMS  Review of Systems   Constitutional: Positive for unexpected weight change. Negative for activity change, appetite change and fever.   HENT: Negative for congestion and sore throat.    Respiratory: Negative for cough and shortness of breath.    Cardiovascular: Negative for chest pain and leg swelling.   Gastrointestinal: Positive for abdominal pain (epigastric) and vomiting (coffee ground). Negative for diarrhea.   Genitourinary: Negative for decreased urine volume and dysuria.   Musculoskeletal: Negative for neck pain.   Skin: Negative for rash and wound.   Neurological: Negative for weakness, numbness and headaches.   Psychiatric/Behavioral: The patient is nervous/anxious.    All other systems reviewed and are negative.      PHYSICAL EXAM  ED Triage Vitals   Temp Heart Rate Resp BP SpO2   05/15/18 2155 05/15/18 2155 05/15/18 2155 05/15/18 2202 05/15/18 2155   96.4 °F (35.8 °C) 87 16 154/83 94 %      Temp src Heart Rate Source Patient Position BP Location FiO2 (%)   05/15/18 2155 05/15/18 2155 05/16/18 0011 05/16/18 0011 --   Tympanic Monitor Lying Right arm        Physical Exam   Constitutional: He is oriented to person, place, and time and well-developed, well-nourished, and in no distress. No distress.   HENT:   Head: Normocephalic and atraumatic.   Eyes: EOM are normal. Pupils are equal, round, and reactive to light.   Neck: Normal range of motion. Neck supple.   No anterior neck swelling   Cardiovascular: Normal rate, regular rhythm and normal heart sounds.    Pulmonary/Chest: Effort normal and breath sounds normal. No respiratory distress.   Abdominal: Soft. There is tenderness (mild to moderate) in the epigastric area. There is no rebound and no guarding.   Musculoskeletal: Normal range of motion. He exhibits no edema.   Extremities are unremarkable   Neurological: He is alert and oriented to person, place, and  time. He has normal sensation and normal strength.   No focal neurological deficits   Skin: Skin is warm and dry.   Psychiatric: Affect normal. His mood appears anxious (mild).   Nursing note and vitals reviewed.      LAB RESULTS  Lab Results (last 24 hours)     Procedure Component Value Units Date/Time    CBC & Differential [[id number]] Collected:  05/15/18 2247    Specimen:  Blood Updated:  05/15/18 2301    Narrative:       The following orders were created for panel order CBC & Differential.  Procedure                               Abnormality         Status                     ---------                               -----------         ------                     CBC Auto Differential[[id number]]        Abnormal            Final result                 Please view results for these tests on the individual orders.    Comprehensive Metabolic Panel [[id number]]  (Abnormal) Collected:  05/15/18 2247    Specimen:  Blood Updated:  05/15/18 2331     Glucose 95 mg/dL      BUN 13 mg/dL      Creatinine 1.07 mg/dL      Sodium 143 mmol/L      Potassium 4.3 mmol/L      Chloride 102 mmol/L      CO2 30.3 (H) mmol/L      Calcium 9.3 mg/dL      Total Protein 7.0 g/dL      Albumin 4.40 g/dL      ALT (SGPT) 11 U/L      AST (SGOT) 15 U/L      Alkaline Phosphatase 84 U/L      Total Bilirubin 0.3 mg/dL      eGFR Non African Amer 75 mL/min/1.73      Globulin 2.6 gm/dL      A/G Ratio 1.7 g/dL      BUN/Creatinine Ratio 12.1     Anion Gap 10.7 mmol/L     Lipase [[id number]]  (Normal) Collected:  05/15/18 2247    Specimen:  Blood Updated:  05/15/18 2331     Lipase 25 U/L     CBC Auto Differential [[id number]]  (Abnormal) Collected:  05/15/18 2247    Specimen:  Blood Updated:  05/15/18 2301     WBC 7.19 10*3/mm3      RBC 4.56 (L) 10*6/mm3      Hemoglobin 13.6 (L) g/dL      Hematocrit 41.5 %      MCV 91.0 fL      MCH 29.8 pg      MCHC 32.8 g/dL      RDW 11.9 %      RDW-SD 39.6 fl      MPV 10.2 fL      Platelets 304 10*3/mm3      Neutrophil % 59.1  %      Lymphocyte % 29.9 %      Monocyte % 8.6 %      Eosinophil % 1.8 %      Basophil % 0.6 %      Immature Grans % 0.3 %      Neutrophils, Absolute 4.25 10*3/mm3      Lymphocytes, Absolute 2.15 10*3/mm3      Monocytes, Absolute 0.62 10*3/mm3      Eosinophils, Absolute 0.13 10*3/mm3      Basophils, Absolute 0.04 10*3/mm3      Immature Grans, Absolute 0.02 10*3/mm3     TSH [[id number]]  (Normal) Collected:  05/15/18 2247    Specimen:  Blood Updated:  05/16/18 0055     TSH 3.010 mIU/mL     T4, Free [[id number]]  (Normal) Collected:  05/15/18 2247    Specimen:  Blood Updated:  05/16/18 0055     Free T4 1.33 ng/dL     Hemoglobin & Hematocrit, Blood [[id number]]  (Abnormal) Collected:  05/16/18 0027    Specimen:  Blood Updated:  05/16/18 0035     Hemoglobin 13.3 (L) g/dL      Hematocrit 39.8 (L) %     Urinalysis With / Culture If Indicated - Urine, Clean Catch [[id number]]  (Abnormal) Collected:  05/16/18 0034    Specimen:  Urine from Urine, Clean Catch Updated:  05/16/18 0047     Color, UA Yellow     Appearance, UA Clear     pH, UA 5.5     Specific Gravity, UA 1.024     Glucose, UA Negative     Ketones, UA Negative     Bilirubin, UA Negative     Blood, UA Moderate (2+) (A)     Protein, UA Negative     Leuk Esterase, UA Negative     Nitrite, UA Negative     Urobilinogen, UA 1.0 E.U./dL    Urinalysis, Microscopic Only - Urine, Clean Catch [[id number]] Collected:  05/16/18 0034    Specimen:  Urine from Urine, Clean Catch Updated:  05/16/18 0047     RBC, UA 0-2 /HPF      WBC, UA 0-2 /HPF      Bacteria, UA None Seen /HPF      Squamous Epithelial Cells, UA 0-2 /HPF      Hyaline Casts, UA 3-6 /LPF      Methodology Automated Microscopy    Hemoglobin & Hematocrit, Blood [[id number]]  (Abnormal) Collected:  05/16/18 0510    Specimen:  Blood Updated:  05/16/18 0518     Hemoglobin 12.3 (L) g/dL      Hematocrit 37.4 (L) %           I ordered the above labs and reviewed the results    RADIOLOGY  CT Abdomen Pelvis With Contrast   Final  Result   IMPRESSION :    1. Left nephrolithiasis, nonobstructing.   2. Severe constipation without obstruction or acute inflammation           This report was finalized on 5/16/2018 1:05 AM by Pipo Madison M.D.               I ordered the above noted radiological studies. Interpreted by radiologist. Reviewed by me in PACS.       PROCEDURES  Procedures      PROGRESS AND CONSULTS   0023  Ordered pepcis for abd pain, CT abd/pelvis, and additional blood work after bedside evaluation.    0042  Ordered ativan for anxiety.    0148  Rechecked pt who is sleeping comfortably after being given ativan. Notified wife of plan to repeat labs at 0400.    0533  Rechecked pt who is resting comfortably. Notified pt of all lab and imaging results. Discussed plan to admit pt. Pt understands and agrees with the plan, all questions answered.    0530  Placed call to [facility] for consult.    0540  Ordered additional ativan for anxiety.    0618  Discussed pt's case, labs and imaging results with Dr. Ware ([facility]) who will admit pt.      MEDICAL DECISION MAKING  Results were reviewed/discussed with the patient and they were also made aware of online access. Pt also made aware that some labs, such as cultures, will not be resulted during ER visit and follow up with PMD is necessary.     MDM  Number of Diagnoses or Management Options  Upper GI bleeding:      Amount and/or Complexity of Data Reviewed  Clinical lab tests: ordered and reviewed (Hemoglobin dropped 1 gram while in ED)  Tests in the radiology section of CPT®: ordered and reviewed (CT abd/pelvis shows left nephrolithiasis and severe constipation.)  Discuss the patient with other providers: yes (Dr. Ware ([facility]))  Independent visualization of images, tracings, or specimens: yes    Patient Progress  Patient progress: stable         DIAGNOSIS  Final diagnoses:   Upper GI bleeding       DISPOSITION  ADMISSION    Discussed treatment plan and reason for admission with pt/family and  admitting physician.  Pt/family voiced understanding of the plan for admission for further testing/treatment as needed.       Latest Documented Vital Signs:  As of 6:20 AM  BP- 107/62 HR- 67 Temp- 96.4 °F (35.8 °C) (Tympanic) O2 sat- 98%    --  Documentation assistance provided by edwige Baca for Dr. Monsivais.  Information recorded by the scribtori was done at my direction and has been verified and validated by me.     Dulce Maria Baca  05/16/18 0620       Alban Monsivais MD  05/16/18 3604

## 2018-05-16 NOTE — ED TRIAGE NOTES
Pt reports generalized abd pain with vomiting since this morning.  Two episodes of emesis today, last episode of emesis was coffee ground.

## 2018-05-16 NOTE — ED TRIAGE NOTES
Patient states he has been stressed, he states he has had stomach pains for a few weeks now and today vomited this am, states it was dark in color.

## 2018-05-16 NOTE — PLAN OF CARE
Problem: Patient Care Overview  Goal: Plan of Care Review  Outcome: Ongoing (interventions implemented as appropriate)   05/16/18 0655 05/16/18 0700   OTHER   Outcome Summary --  VSS, pt just arrived to unit. No complaints of pain, N/V   Coping/Psychosocial   Plan of Care Reviewed With patient;spouse --

## 2018-05-17 ENCOUNTER — ON CAMPUS - OUTPATIENT (OUTPATIENT)
Dept: URBAN - METROPOLITAN AREA HOSPITAL 114 | Facility: HOSPITAL | Age: 48
End: 2018-05-17

## 2018-05-17 VITALS
DIASTOLIC BLOOD PRESSURE: 72 MMHG | WEIGHT: 187 LBS | SYSTOLIC BLOOD PRESSURE: 121 MMHG | OXYGEN SATURATION: 96 % | TEMPERATURE: 98.1 F | HEIGHT: 71 IN | BODY MASS INDEX: 26.18 KG/M2 | HEART RATE: 75 BPM | RESPIRATION RATE: 16 BRPM

## 2018-05-17 DIAGNOSIS — K29.70 GASTRITIS, UNSPECIFIED, WITHOUT BLEEDING: ICD-10-CM

## 2018-05-17 DIAGNOSIS — K59.00 CONSTIPATION, UNSPECIFIED: ICD-10-CM

## 2018-05-17 PROBLEM — R11.10 VOMITING: Status: ACTIVE | Noted: 2018-05-16

## 2018-05-17 LAB
ANION GAP SERPL CALCULATED.3IONS-SCNC: 11.1 MMOL/L
BUN BLD-MCNC: 10 MG/DL (ref 6–20)
BUN/CREAT SERPL: 10.6 (ref 7–25)
CALCIUM SPEC-SCNC: 8.8 MG/DL (ref 8.6–10.5)
CHLORIDE SERPL-SCNC: 105 MMOL/L (ref 98–107)
CO2 SERPL-SCNC: 26.9 MMOL/L (ref 22–29)
CORTIS SERPL-MCNC: 1.53 MCG/DL
CREAT BLD-MCNC: 0.94 MG/DL (ref 0.76–1.27)
CYTO UR: NORMAL
DEPRECATED RDW RBC AUTO: 40.2 FL (ref 37–54)
ERYTHROCYTE [DISTWIDTH] IN BLOOD BY AUTOMATED COUNT: 11.8 % (ref 11.5–14.5)
FERRITIN SERPL-MCNC: 85.84 NG/ML (ref 30–400)
GFR SERPL CREATININE-BSD FRML MDRD: 87 ML/MIN/1.73
GLUCOSE BLD-MCNC: 134 MG/DL (ref 65–99)
HCT VFR BLD AUTO: 38.7 % (ref 40.4–52.2)
HGB BLD-MCNC: 12.4 G/DL (ref 13.7–17.6)
IRON 24H UR-MRATE: 32 MCG/DL (ref 59–158)
IRON SATN MFR SERPL: 12 % (ref 20–50)
LAB AP CASE REPORT: NORMAL
Lab: NORMAL
MAGNESIUM SERPL-MCNC: 2.2 MG/DL (ref 1.6–2.6)
MCH RBC QN AUTO: 29.6 PG (ref 27–32.7)
MCHC RBC AUTO-ENTMCNC: 32 G/DL (ref 32.6–36.4)
MCV RBC AUTO: 92.4 FL (ref 79.8–96.2)
PATH REPORT.FINAL DX SPEC: NORMAL
PATH REPORT.GROSS SPEC: NORMAL
PHOSPHATE SERPL-MCNC: 4.4 MG/DL (ref 2.5–4.5)
PLATELET # BLD AUTO: 230 10*3/MM3 (ref 140–500)
PMV BLD AUTO: 10.3 FL (ref 6–12)
POTASSIUM BLD-SCNC: 3.9 MMOL/L (ref 3.5–5.2)
RBC # BLD AUTO: 4.19 10*6/MM3 (ref 4.6–6)
SODIUM BLD-SCNC: 143 MMOL/L (ref 136–145)
TIBC SERPL-MCNC: 273 MCG/DL
TRANSFERRIN SERPL-MCNC: 183 MG/DL (ref 200–360)
TSH SERPL DL<=0.05 MIU/L-ACNC: 1.25 MIU/ML (ref 0.27–4.2)
WBC NRBC COR # BLD: 6.09 10*3/MM3 (ref 4.5–10.7)

## 2018-05-17 PROCEDURE — 99204 OFFICE O/P NEW MOD 45 MIN: CPT | Performed by: INTERNAL MEDICINE

## 2018-05-17 PROCEDURE — 84466 ASSAY OF TRANSFERRIN: CPT | Performed by: INTERNAL MEDICINE

## 2018-05-17 PROCEDURE — 83735 ASSAY OF MAGNESIUM: CPT | Performed by: HOSPITALIST

## 2018-05-17 PROCEDURE — 83540 ASSAY OF IRON: CPT | Performed by: INTERNAL MEDICINE

## 2018-05-17 PROCEDURE — 99212 OFFICE O/P EST SF 10 MIN: CPT | Performed by: INTERNAL MEDICINE

## 2018-05-17 PROCEDURE — G0378 HOSPITAL OBSERVATION PER HR: HCPCS

## 2018-05-17 PROCEDURE — 85027 COMPLETE CBC AUTOMATED: CPT | Performed by: INTERNAL MEDICINE

## 2018-05-17 PROCEDURE — 82390 ASSAY OF CERULOPLASMIN: CPT | Performed by: INTERNAL MEDICINE

## 2018-05-17 PROCEDURE — 84443 ASSAY THYROID STIM HORMONE: CPT | Performed by: HOSPITALIST

## 2018-05-17 PROCEDURE — 82533 TOTAL CORTISOL: CPT | Performed by: HOSPITALIST

## 2018-05-17 PROCEDURE — 84100 ASSAY OF PHOSPHORUS: CPT | Performed by: HOSPITALIST

## 2018-05-17 PROCEDURE — 82728 ASSAY OF FERRITIN: CPT | Performed by: INTERNAL MEDICINE

## 2018-05-17 PROCEDURE — 80048 BASIC METABOLIC PNL TOTAL CA: CPT | Performed by: HOSPITALIST

## 2018-05-17 RX ORDER — FERROUS SULFATE 325(65) MG
325 TABLET ORAL
Qty: 30 TABLET | Refills: 0 | Status: SHIPPED | OUTPATIENT
Start: 2018-05-17 | End: 2020-02-23

## 2018-05-17 RX ORDER — PANTOPRAZOLE SODIUM 40 MG/1
40 TABLET, DELAYED RELEASE ORAL DAILY
Qty: 30 TABLET | Refills: 0 | Status: SHIPPED | OUTPATIENT
Start: 2018-05-17 | End: 2018-12-09

## 2018-05-17 RX ADMIN — PANTOPRAZOLE SODIUM 40 MG: 40 TABLET, DELAYED RELEASE ORAL at 07:40

## 2018-05-17 RX ADMIN — MAGNESIUM HYDROXIDE 40 ML: 2400 SUSPENSION ORAL at 09:41

## 2018-05-17 NOTE — CONSULTS
47 y.o.  Patient Care Team:  AYLA Koch as PCP - General (Family Medicine)      Chief Complaint   Patient presents with   • Abdominal Pain   • Vomiting Blood   Reason for consultation-abnormal cortisol levels.    HPI  47-year-old white gentleman with no significant past medical history present to the hospital with abdominal pain, nausea and coffee ground emesis.  Endocrinology has been consulted due to the significant amount of weight loss in the last few months.  Next    Patient reports that he has lost about 10-20 pounds in the last 1-2 months, and the weight loss was unintentional, he also complains of feeling extremely tired along with the weight loss, is anxious about this, no complains of low or high blood pressure episodes, no significant dizziness episodes, no headaches, no loss of consciousness, no proximal muscle weakness.  He does have family history of thyroid disease in his daughter and son.  He is not on any medications at this time and denied receiving any steroids shots or oral steroids in the last 2-3 months.    Results for HASTING, TRACE (MRN [id number]) as of 5/17/2018 15:29   Ref. Range 5/15/2018 22:47 5/16/2018 00:27 5/16/2018 00:34 5/16/2018 00:55 5/16/2018 05:10 5/16/2018 13:08 5/16/2018 14:19 5/16/2018 19:54 5/17/2018 03:15   Cortisol Latest Ref Range:   mcg/dL 4.84      3.42 20.18 1.53 (C)         History reviewed. No pertinent past medical history.    History reviewed. No pertinent family history.    Social History     Social History   • Marital status: Single     Spouse name: N/A   • Number of children: N/A   • Years of education: N/A     Occupational History   • Not on file.     Social History Main Topics   • Smoking status: Never Smoker   • Smokeless tobacco: Current User     Types: Chew   • Alcohol use No   • Drug use: No   • Sexual activity: Defer     Other Topics Concern   • Not on file     Social History Narrative   • No narrative on file       Allergies   Allergen  Reactions   • Metformin And Related GI Intolerance     INCREASED CREATININE         Current Facility-Administered Medications:   •  acetaminophen (TYLENOL) tablet 650 mg, 650 mg, Oral, Q4H PRN, Valente Ware MD, 650 mg at 05/16/18 2115  •  magnesium hydroxide (MILK OF MAGNESIA) suspension 2400 mg/10mL 40 mL, 40 mL, Oral, Daily PRN, Mike Monroy MD, 40 mL at 05/17/18 0941  •  ondansetron (ZOFRAN) tablet 4 mg, 4 mg, Oral, Q6H PRN **OR** ondansetron ODT (ZOFRAN-ODT) disintegrating tablet 4 mg, 4 mg, Oral, Q6H PRN **OR** ondansetron (ZOFRAN) injection 4 mg, 4 mg, Intravenous, Q6H PRN, Valente Ware MD  •  pantoprazole (PROTONIX) EC tablet 40 mg, 40 mg, Oral, BID AC, Mike Monroy MD, 40 mg at 05/17/18 0740  •  sodium chloride 0.9 % flush 1-10 mL, 1-10 mL, Intravenous, PRN, Valente Ware MD  •  sodium chloride 0.9 % flush 10 mL, 10 mL, Intravenous, PRN, Alban Monsivais MD  •  sodium chloride 0.9 % infusion, 125 mL/hr, Intravenous, Continuous, Alban Monsivais MD, Stopped at 05/16/18 1500         Review of Systems   Constitutional: Positive for appetite change, fatigue and unexpected weight change.   Eyes: Negative for visual disturbance.   Respiratory: Negative for shortness of breath.    Cardiovascular: Negative for palpitations.   Gastrointestinal: Negative for nausea and vomiting.   Endocrine: Negative for polydipsia and polyuria.   Musculoskeletal: Positive for arthralgias.   Skin: Negative for pallor.   Neurological: Positive for weakness. Negative for numbness.   Psychiatric/Behavioral: Negative for agitation.     Objective     Vital Signs  Temp:  [98 °F (36.7 °C)-98.4 °F (36.9 °C)] 98.1 °F (36.7 °C)  Heart Rate:  [59-75] 75  Resp:  [16] 16  BP: (106-141)/(62-88) 121/72    Physical Exam  Physical Exam   Constitutional: He is oriented to person, place, and time. He appears well-nourished.   HENT:   Head: Normocephalic and atraumatic.   Eyes: Conjunctivae and EOM are normal.   Neck: Normal range of  "motion. Neck supple. Carotid bruit is not present. No thyromegaly present.   Cardiovascular: Normal rate and normal heart sounds.    Pulmonary/Chest: Effort normal and breath sounds normal. No stridor. No respiratory distress. He has no wheezes.   Abdominal: Soft. Bowel sounds are normal. He exhibits no distension. There is no tenderness.   Musculoskeletal: He exhibits no edema or tenderness.   Lymphadenopathy:     He has no cervical adenopathy.   Neurological: He is alert and oriented to person, place, and time.   Skin: Skin is warm and dry.   Psychiatric: He has a normal mood and affect. His behavior is normal.   Vitals reviewed.      Results Review:    I reviewed the patient's new clinical results.  No results found for: POCGLU  Lab Results (last 24 hours)     Procedure Component Value Units Date/Time    Tissue Pathology Exam [[id number]] Collected:  05/16/18 1308    Specimen:  Tissue from Small Intestine, Duodenum; Tissue from Stomach Updated:  05/17/18 1312     Case Report --     Surgical Pathology Report                         Case: [id number]                                  Authorizing Provider:  Mike Monroy MD         Collected:           05/16/2018 01:08 PM          Ordering Location:     [facility]  Received:            05/16/2018 02:49 PM                                 ENDO SUITES                                                                  Pathologist:           Nick Cruz MD                                                       Specimens:   1) - Small Intestine, Duodenum, DUODENAL BIOPSIES                                                   2) - Stomach, GASTRIC BIOPSIES                                                              Final Diagnosis --     1. \"DUODENAL BIOPSIES\":   BENIGN SMALL INTESTINAL MUCOSAL BIOPSIES -    NO SIGNIFICANT HISTOLOGIC ABNORMALITY.    2. \"GASTRIC BIOPSIES\":   BENIGN GASTRIC MUCOSAL BIOPSIES WITH PATCHY MILD CHRONIC INFLAMMATION.   NO " "INTESTINAL METAPLASIA.   DIFF-QUIK STAIN: NO HELICOBACTER SEEN.    [facility]    CPT CODES:  1. 78038  2. 06457, 01299         Gross Description --     1.  Received in formalin designated \"small intestine, duodenal biopsies\" are fragments of tan tissue 0.9 x 0.5 x 0.2 cm in aggregate of submitted labeled 1A.      2.  In the second container in formalin designated \"gastric biopsies\" are fragments of tan tissue 0.9 x 0.2 x 0.2 cm in aggregate.  Submitted labeled 2A.  KAREN/brb       Microscopic Description --     Performed, incorporated in diagnosis.       Embedded Images --    Cortisol [[id number]]  (Abnormal) Collected:  05/17/18 0315    Specimen:  Blood Updated:  05/17/18 0507     Cortisol 1.53 (C) mcg/dL     Narrative:       Cortisol Reference Ranges:    Cortisol 6AM - 10AM Range: 6.02-18.40 mcg/dl  Cortisol 4PM - 8PM Range: 2.68-10.50 mcg/dl  Critical AM/PM:    Less than 2 mcg/dl    TSH [[id number]]  (Normal) Collected:  05/17/18 0315    Specimen:  Blood Updated:  05/17/18 0451     TSH 1.250 mIU/mL     Ferritin [[id number]]  (Normal) Collected:  05/17/18 0315    Specimen:  Blood Updated:  05/17/18 0451     Ferritin 85.84 ng/mL     Basic Metabolic Panel [[id number]]  (Abnormal) Collected:  05/17/18 0315    Specimen:  Blood Updated:  05/17/18 0443     Glucose 134 (H) mg/dL      BUN 10 mg/dL      Creatinine 0.94 mg/dL      Sodium 143 mmol/L      Potassium 3.9 mmol/L      Chloride 105 mmol/L      CO2 26.9 mmol/L      Calcium 8.8 mg/dL      eGFR Non African Amer 87 mL/min/1.73      BUN/Creatinine Ratio 10.6     Anion Gap 11.1 mmol/L     Narrative:       GFR Normal >60  Chronic Kidney Disease <60  Kidney Failure <15    Magnesium [[id number]]  (Normal) Collected:  05/17/18 0315    Specimen:  Blood Updated:  05/17/18 0443     Magnesium 2.2 mg/dL     Phosphorus [[id number]]  (Normal) Collected:  05/17/18 0315    Specimen:  Blood Updated:  05/17/18 0443     Phosphorus 4.4 mg/dL     Iron Profile [[id number]]  (Abnormal) Collected:  " 05/17/18 0315    Specimen:  Blood Updated:  05/17/18 0443     Iron 32 (L) mcg/dL      Iron Saturation 12 (L) %      Transferrin 183 (L) mg/dL      TIBC 273 mcg/dL     CBC (No Diff) [[id number]]  (Abnormal) Collected:  05/17/18 0315    Specimen:  Blood Updated:  05/17/18 0429     WBC 6.09 10*3/mm3      RBC 4.19 (L) 10*6/mm3      Hemoglobin 12.4 (L) g/dL      Hematocrit 38.7 (L) %      MCV 92.4 fL      MCH 29.6 pg      MCHC 32.0 (L) g/dL      RDW 11.8 %      RDW-SD 40.2 fl      MPV 10.3 fL      Platelets 230 10*3/mm3     Ceruloplasmin [[id number]] Collected:  05/17/18 0315    Specimen:  Blood Updated:  05/17/18 0416    Cortisol [[id number]]  (Normal) Collected:  05/16/18 1954    Specimen:  Blood Updated:  05/16/18 2054     Cortisol 20.18 mcg/dL     Narrative:       Cortisol Reference Ranges:    Cortisol 6AM - 10AM Range: 6.02-18.40 mcg/dl  Cortisol 4PM - 8PM Range: 2.68-10.50 mcg/dl  Critical AM/PM:    Less than 2 mcg/dl          Imaging Results (last 24 hours)     ** No results found for the last 24 hours. **          Assessment/Plan     Active Hospital Problems (** Indicates Principal Problem)    Diagnosis Date Noted   • **Vomiting [R11.10] 05/16/2018   • Gastritis [K29.70] 05/17/2018   • Weight loss [R63.4] 05/16/2018   • Constipation [K59.00] 05/16/2018   • Anxiety [F41.9] 05/16/2018      Resolved Hospital Problems    Diagnosis Date Noted Date Resolved   No resolved problems to display.       Abnormal cortisol levels  Patient's cortisol levels were checked at later in the night or early in the morning during which time cortisol levels could be low.  Patient's cortisol levels were not checked between 8-9 AM at which time cortisol levels will tend to be within normal limits.  Patient also underwent cosyntropin stim test which was not performed correctly.    Patient is being discharged today, given patient's symptoms and his electrolyte panel suspicion for adrenal insufficiency is on the low end.  Would consider  "repeating patient's cortisol levels as an outpatient.    Will follow-up in clinic in the next 1-2 weeks.    Weight loss  TSH levels have been checked and are within normal limits.    Benny Hartman MD.  05/17/18  3:29 PM        EMR Dragon / transcription disclaimer:    \"Dictated utilizing Dragon dictation\".   "

## 2018-05-17 NOTE — DISCHARGE SUMMARY
PHYSICIAN DISCHARGE SUMMARY                                                                        [facility]    Patient Identification:  Name: Rinku Bray  Age: 47 y.o.  Sex: male  :  1970  MRN: [id number]  Primary Care Physician: AYLA Mendoza    Admit date: 2018  Discharge date and time: 2018     Discharged Condition: good    Discharge Diagnoses:Principal Problem:    Vomiting  Active Problems:    Gastritis    Weight loss    Constipation    Anxiety         Hospital Course:  47-year-old gentleman presents complaining of vomiting producing dark coffee-ground type material.  Please H&P for full details.  The patient was admitted and started on a PPI drip in GI consultation was obtained.  EGD was performed which showed evidence of chronic gastritis.  No active bleeding.  The PPI drip was discontinued he is started back on oral intake which she has tolerated very well.  Hemoglobins remained stable.  The patient also complains of an 80 pound weight loss in the last year.  On examination there is no unusually loose skin or stria apparent.  I was able to find one weight in this facility in 2017 which was 200 pounds.  He now weighs in at 187 pounds.  This represents a 13 pound weight loss over the last 8 months.  This leaves 67 pounds of weight loss unaccounted for.  He did have a CT scan of the abdomen done here which showed nonobstructing nephrolithiasis and constipation but otherwise was unremarkable.  I have encouraged him to follow-up with primary care physician for further evaluation and possible weight loss.  The cortisol level was drawn during hospitalization which was on the low side but with good response to cosyntropin.  I did request an evaluation by endocrinology and he has been seeing this morning and has been cleared for further follow-up as an outpatient in this regard.  The patient had  a number requests for medications for anxiety and sleep.  I have encouraged him to follow-up with his primary care physician this regard as well as offered and recommended counseling prior to getting started on habit-forming medications.  At present he is very anxious to be discharge was in a good be very reasonable for further follow-up as an outpatient.      Consults:     Consults     Date and Time Order Name Status Description    5/17/2018 0856 Inpatient Endocrinology Consult      5/16/2018 0911 Inpatient Gastroenterology Consult Completed     5/16/2018 0530 [facility] (on-call MD unless specified) Completed             Discharge Exam:  Physical Exam   Afebrile vital signs stable.  Well-developed well-nourished male in no apparent distress.  Lungs clear to auscultation good air movement.  Heart regular rate and rhythm.  Abdomen benign.  Extremities no clumsiness cyanosis edema.  Alert oriented conversant cooperative.       Disposition:  Home    Patient Instructions:    Hasting, Trace   Home Medication Instructions PETRONA:[id number]    Printed on:05/17/18 4501   Medication Information                      ferrous sulfate 325 (65 FE) MG tablet  Take 1 tablet by mouth Daily With Breakfast.             ondansetron (ZOFRAN) 4 MG tablet  Take 1 tablet by mouth Every 6 (Six) Hours As Needed for Nausea or Vomiting.             pantoprazole (PROTONIX) 40 MG EC tablet  Take 1 tablet by mouth Daily.             promethazine (PHENERGAN) 25 MG tablet  Take 1 tablet by mouth Every 6 (Six) Hours As Needed for Nausea or Vomiting (do not drive on medication).               Diet Instructions     Diet: Regular       Discharge Diet:  Regular        No future appointments.  Additional Instructions for the Follow-ups that You Need to Schedule     Discharge Follow-up with PCP    As directed      Follow Up Details:  1 week         Discharge Follow-up with Specialty: Endocrinology, Gastroenterology.    As directed      Specialty:  Endocrinology,  Gastroenterology.           Follow-up Information     AYLA Mendoza .    Specialty:  Family Medicine  Why:  1 week  Contact information:  [address], [facility] [address]  [place] [id number]  [phone]                 Discharge Order     Start     Ordered    05/17/18 1453  Discharge patient  Once     Expected Discharge Date:  05/17/18    Discharge Disposition:  Home or Self Care    Physician of Record:  VALENTE WARE [[id number]]    Physician of Record selection review needed?:  No       Question Answer Comment   Physician of Record VALENTE WARE    Physician of Record selection review needed? No        05/17/18 1455            Total time spent discharging patient including evaluation,post hospitalization follow up,  medication and post hospitalization instructions and education total time exceeds 30 minutes.    Signed:  Valente Ware MD  5/17/2018  2:56 PM    EMR Dragon/Transcription disclaimer:   Much of this encounter note is an electronic transcription/translation of spoken language to printed text. The electronic translation of spoken language may permit erroneous, or at times, nonsensical words or phrases to be inadvertently transcribed; Although I have reviewed the note for such errors, some may still exist.

## 2018-05-17 NOTE — PROGRESS NOTES
Continued Stay Note   [facility]     Patient Name: Rinku Bray  MRN: [id number]  Today's Date: 5/17/2018    Admit Date: 5/16/2018          Discharge Plan     Row Name 05/17/18 1124       Plan    Plan Home with s/o and family assist    Patient/Family in Agreement with Plan yes    Plan Comments Singh Notice 5/17/18. Spoke with patient at bedside. Introduced self and role. Patient is IADLs, drives and has no home safety concerns. His s/o Sandra and mother demetrius can assist if needed. Patient has not used HH/SNf but has used out pt PT many years ago. He is unsure of the providers. Confirmed and updated face sheet. PCP is Elba AGUILA and pharmacy is [facility] in [facility]. Patient has no advanced directives and says his s/o Sandra Minda [phone] is his decision maker and emergency contact. Patient plans home and no needs. S/o/family can transport at discharge. Singh notice given.   DC plan home with assist of s/o and family if needed.               Discharge Codes    No documentation.       Expected Discharge Date and Time     Expected Discharge Date Expected Discharge Time    May 17, 2018             David López RN  Discharge Planning Assessment   [facility]     Patient Name: Rinku Bray  MRN: [id number]  Today's Date: 5/17/2018    Admit Date: 5/16/2018          Discharge Needs Assessment     Row Name 05/17/18 1123       Living Environment    Lives With significant other    Name(s) of Who Lives With Patient Demetrius Bray    Current Living Arrangements home/apartment/condo    Primary Care Provided by self    Provides Primary Care For no one    Family Caregiver if Needed significant other    Quality of Family Relationships helpful;involved;stressful    Able to Return to Prior Arrangements yes       Resource/Environmental Concerns    Resource/Environmental Concerns none    Transportation Concerns car, none       Transition Planning    Patient/Family Anticipates Transition to home with family     Patient/Family Anticipated Services at Transition none    Transportation Anticipated family or friend will provide;car, drives self       Discharge Needs Assessment    Readmission Within the Last 30 Days no previous admission in last 30 days    Concerns to be Addressed no discharge needs identified    Equipment Currently Used at Home none    Anticipated Changes Related to Illness none    Equipment Needed After Discharge none    Outpatient/Agency/Support Group Needs other (see comments)   Has used out pt many years ago. No HH/SNF.    Offered/Gave Vendor List no            Discharge Plan     Row Name 05/17/18 1124       Plan    Plan Home with s/o and family assist    Patient/Family in Agreement with Plan yes    Plan Comments Singh Notice 5/17/18.        Destination     No service coordination in this encounter.      Durable Medical Equipment     No service coordination in this encounter.      Dialysis/Infusion     No service coordination in this encounter.      Home Medical Care     No service coordination in this encounter.      Social Care     No service coordination in this encounter.        Expected Discharge Date and Time     Expected Discharge Date Expected Discharge Time    May 17, 2018               Demographic Summary     Row Name 05/17/18 1122       General Information    Admission Type observation    Arrived From home    Required Notices Provided Observation Status Notice    Referral Source admission list    Reason for Consult discharge planning    Preferred Language English     Used During This Interaction no       Contact Information    Permission Granted to Share Info With             Functional Status     Row Name 05/17/18 1123       Functional Status    Usual Activity Tolerance good    Current Activity Tolerance moderate       Functional Status, IADL    Medications independent    Meal Preparation independent    Housekeeping independent    Laundry independent    Shopping  independent       Mental Status    General Appearance WDL WDL       Mental Status Summary    Recent Changes in Mental Status/Cognitive Functioning no changes            Psychosocial    No documentation.           Abuse/Neglect    No documentation.           Legal    No documentation.           Substance Abuse    No documentation.           Patient Forms     Row Name 05/17/18 1124       Patient Forms    Patient Observation Letter Delivered   Singh Notice    Delivered to Patient    Method of delivery In person          David López RN

## 2018-05-17 NOTE — PROGRESS NOTES
"   LOS: 1 day   Patient Care Team:  AYLA Koch as PCP - General (Family Medicine)    Chief Complaint: nausea, gastritis    Subjective     History of Present Illness    Subjective:  Symptoms:  Improved.    Diet:  Adequate intake.    Activity level: Impaired due to pain.    Pain:  He complains of pain that is mild.      Patient  With nausea, anxiety,  Thinks may be having a bowel movement   History taken from: patient chart family RN    Objective     Vital Signs  Temp:  [98 °F (36.7 °C)-98.4 °F (36.9 °C)] 98.2 °F (36.8 °C)  Heart Rate:  [58-71] 59  Resp:  [11-16] 16  BP: (104-155)/(62-88) 106/62    Objective:  Vital signs: (most recent): Blood pressure 121/72, pulse 75, temperature 98.1 °F (36.7 °C), temperature source Oral, resp. rate 16, height 180.3 cm (71\"), weight 84.8 kg (187 lb), SpO2 96 %.  Vital signs are normal.    Output: Producing urine.    HEENT: Normal HEENT exam.    Lungs:  Normal effort.    Heart: Normal rate.    Abdomen: There is epigastric tenderness. There is no rebound tenderness.  There is no guarding.               Results Review:     I reviewed the patient's new clinical results.  I reviewed the patient's new imaging results and agree with the interpretation.    Medication Review: done    Assessment/Plan     Principal Problem:    Upper GI bleeding  Active Problems:    Weight loss    Constipation    Anxiety      Assessment:  (Erosive gastritis  h pylori negative   constipation/obstipation).     Plan:   (Ok to d/c on a proton pump inhibitor   and daily milk of magnesia for constipation  To see my NP in 6 weeks for followup .).       Mike Monroy MD  05/17/18  12:26 PM    Time: Critical care 11 min      "

## 2018-05-17 NOTE — DISCHARGE INSTR - LAB
Call to schedule a 1 to 2 week follow up appointment with Dr. Hartman  ( endocrinology)  # [phone]

## 2018-05-17 NOTE — PLAN OF CARE
Problem: Pain, Chronic (Adult)  Goal: Identify Related Risk Factors and Signs and Symptoms  Outcome: Outcome(s) achieved Date Met: 05/17/18 05/17/18 0037   Pain, Chronic (Adult)   Related Risk Factors (Chronic Pain) disease process;coping ineffective;knowledge deficit;psychosocial factor   Signs and Symptoms (Chronic Pain) verbalization of pain/discomfort for a prolonged time period     Goal: Acceptable Pain/Comfort Level and Functional Ability  Outcome: Ongoing (interventions implemented as appropriate)   05/17/18 0037   Pain, Chronic (Adult)   Acceptable Pain/Comfort Level and Functional Ability making progress toward outcome       Problem: Patient Care Overview  Goal: Plan of Care Review  Outcome: Ongoing (interventions implemented as appropriate)   05/17/18 0037   OTHER   Outcome Summary VSS, pt verbalizing s/sx of anxiety, given 1x dose of xanax at hs, expected to d/c home tomorrow.    Coping/Psychosocial   Plan of Care Reviewed With patient;spouse   Plan of Care Review   Progress improving       Problem: Nutrition, Imbalanced: Inadequate Oral Intake (Adult)  Goal: Identify Related Risk Factors and Signs and Symptoms  Outcome: Outcome(s) achieved Date Met: 05/17/18 05/17/18 0037   Nutrition, Imbalanced: Inadequate Oral Intake (Adult)   Related Risk Factors (Nutrition Imbalance, Inadequate Oral Intake) appetite decreased;chronic illness/infection;eating difficulty   Signs and Symptoms (Nutrition Imbalance, Inadequate Oral Intake: Signs and Symptoms) functional impairment;muscle mass/strength decreased;nausea and vomiting;physical symptoms;weakness/lethargy;weight decreased (percent weight loss, percent usual body weight, body mass index less than 18.5) (Adults)     Goal: Prevent Further Weight Loss  Outcome: Ongoing (interventions implemented as appropriate)   05/17/18 0037   Nutrition, Imbalanced: Inadequate Oral Intake (Adult)   Prevent Further Weight Loss making progress toward outcome

## 2018-05-18 LAB — CERULOPLASMIN SERPL-MCNC: 19.3 MG/DL (ref 16–31)

## 2018-05-25 ENCOUNTER — HOSPITAL ENCOUNTER (EMERGENCY)
Facility: HOSPITAL | Age: 48
Discharge: HOME | End: 2018-05-25
Attending: EMERGENCY MEDICINE | Admitting: EMERGENCY MEDICINE

## 2018-05-25 VITALS
SYSTOLIC BLOOD PRESSURE: 147 MMHG | HEIGHT: 71 IN | HEART RATE: 89 BPM | RESPIRATION RATE: 18 BRPM | BODY MASS INDEX: 25.9 KG/M2 | OXYGEN SATURATION: 100 % | WEIGHT: 185 LBS | TEMPERATURE: 98.3 F | DIASTOLIC BLOOD PRESSURE: 99 MMHG

## 2018-05-25 DIAGNOSIS — K92.2 UPPER GI BLEED: ICD-10-CM

## 2018-05-25 DIAGNOSIS — F41.9 ANXIETY DISORDER, UNSPECIFIED TYPE: Primary | ICD-10-CM

## 2018-05-25 LAB
BASOPHILS # BLD AUTO: 0.02 10*3/MM3 (ref 0–0.2)
BASOPHILS NFR BLD AUTO: 0.3 % (ref 0–1.5)
BILIRUB UR QL STRIP: NEGATIVE
CLARITY UR: CLEAR
COLOR UR: YELLOW
DEPRECATED RDW RBC AUTO: 39.1 FL (ref 37–54)
EOSINOPHIL # BLD AUTO: 0.12 10*3/MM3 (ref 0–0.7)
EOSINOPHIL NFR BLD AUTO: 1.8 % (ref 0.3–6.2)
ERYTHROCYTE [DISTWIDTH] IN BLOOD BY AUTOMATED COUNT: 12.2 % (ref 11.5–14.5)
GLUCOSE UR STRIP-MCNC: NEGATIVE MG/DL
HCT VFR BLD AUTO: 40.5 % (ref 40.4–52.2)
HGB BLD-MCNC: 13.8 G/DL (ref 13.7–17.6)
HGB UR QL STRIP.AUTO: NEGATIVE
HOLD SPECIMEN: NORMAL
HOLD SPECIMEN: NORMAL
IMM GRANULOCYTES # BLD: 0.01 10*3/MM3 (ref 0–0.03)
IMM GRANULOCYTES NFR BLD: 0.2 % (ref 0–0.5)
KETONES UR QL STRIP: ABNORMAL
LEUKOCYTE ESTERASE UR QL STRIP.AUTO: NEGATIVE
LIPASE SERPL-CCNC: 27 U/L (ref 13–60)
LYMPHOCYTES # BLD AUTO: 1.78 10*3/MM3 (ref 0.9–4.8)
LYMPHOCYTES NFR BLD AUTO: 27 % (ref 19.6–45.3)
MCH RBC QN AUTO: 29.9 PG (ref 27–32.7)
MCHC RBC AUTO-ENTMCNC: 34.1 G/DL (ref 32.6–36.4)
MCV RBC AUTO: 87.7 FL (ref 79.8–96.2)
MONOCYTES # BLD AUTO: 0.45 10*3/MM3 (ref 0.2–1.2)
MONOCYTES NFR BLD AUTO: 6.8 % (ref 5–12)
NEUTROPHILS # BLD AUTO: 4.23 10*3/MM3 (ref 1.9–8.1)
NEUTROPHILS NFR BLD AUTO: 64.1 % (ref 42.7–76)
NITRITE UR QL STRIP: NEGATIVE
PH UR STRIP.AUTO: 6 [PH] (ref 5–8)
PLATELET # BLD AUTO: 272 10*3/MM3 (ref 140–500)
PMV BLD AUTO: 9.9 FL (ref 6–12)
PROT UR QL STRIP: NEGATIVE
RBC # BLD AUTO: 4.62 10*6/MM3 (ref 4.6–6)
SP GR UR STRIP: >=1.03 (ref 1–1.03)
UROBILINOGEN UR QL STRIP: ABNORMAL
WBC NRBC COR # BLD: 6.6 10*3/MM3 (ref 4.5–10.7)
WHOLE BLOOD HOLD SPECIMEN: NORMAL
WHOLE BLOOD HOLD SPECIMEN: NORMAL

## 2018-05-25 PROCEDURE — 99283 EMERGENCY DEPT VISIT LOW MDM: CPT

## 2018-05-25 PROCEDURE — 36415 COLL VENOUS BLD VENIPUNCTURE: CPT

## 2018-05-25 PROCEDURE — 83690 ASSAY OF LIPASE: CPT

## 2018-05-25 PROCEDURE — 81003 URINALYSIS AUTO W/O SCOPE: CPT

## 2018-05-25 PROCEDURE — 85025 COMPLETE CBC W/AUTO DIFF WBC: CPT

## 2018-05-25 RX ORDER — LORAZEPAM 1 MG/1
1 TABLET ORAL EVERY 8 HOURS PRN
Qty: 15 TABLET | Refills: 0 | OUTPATIENT
Start: 2018-05-25 | End: 2018-12-09

## 2018-05-25 RX ORDER — SODIUM CHLORIDE 0.9 % (FLUSH) 0.9 %
10 SYRINGE (ML) INJECTION AS NEEDED
Status: DISCONTINUED | OUTPATIENT
Start: 2018-05-25 | End: 2018-05-26 | Stop reason: HOSPADM

## 2018-12-09 ENCOUNTER — APPOINTMENT (OUTPATIENT)
Dept: GENERAL RADIOLOGY | Facility: HOSPITAL | Age: 48
End: 2018-12-09

## 2018-12-09 ENCOUNTER — HOSPITAL ENCOUNTER (EMERGENCY)
Facility: HOSPITAL | Age: 48
Discharge: HOME | End: 2018-12-09
Attending: EMERGENCY MEDICINE | Admitting: EMERGENCY MEDICINE

## 2018-12-09 VITALS
DIASTOLIC BLOOD PRESSURE: 93 MMHG | TEMPERATURE: 97.6 F | HEIGHT: 70 IN | HEART RATE: 101 BPM | WEIGHT: 209.5 LBS | BODY MASS INDEX: 29.99 KG/M2 | SYSTOLIC BLOOD PRESSURE: 135 MMHG | RESPIRATION RATE: 16 BRPM | OXYGEN SATURATION: 100 %

## 2018-12-09 DIAGNOSIS — M10.9 ACUTE GOUTY ARTHRITIS: ICD-10-CM

## 2018-12-09 DIAGNOSIS — K29.70 GASTRITIS WITHOUT BLEEDING, UNSPECIFIED CHRONICITY, UNSPECIFIED GASTRITIS TYPE: ICD-10-CM

## 2018-12-09 DIAGNOSIS — F41.9 ANXIETY: ICD-10-CM

## 2018-12-09 DIAGNOSIS — R10.10 UPPER ABDOMINAL PAIN: Primary | ICD-10-CM

## 2018-12-09 LAB
ALBUMIN SERPL-MCNC: 4.7 G/DL (ref 3.5–5.2)
ALBUMIN/GLOB SERPL: 2 G/DL
ALP SERPL-CCNC: 89 U/L (ref 39–117)
ALT SERPL W P-5'-P-CCNC: 11 U/L (ref 1–41)
ANION GAP SERPL CALCULATED.3IONS-SCNC: 11.3 MMOL/L
AST SERPL-CCNC: 12 U/L (ref 1–40)
BASOPHILS # BLD AUTO: 0.03 10*3/MM3 (ref 0–0.2)
BASOPHILS NFR BLD AUTO: 0.4 % (ref 0–1.5)
BILIRUB SERPL-MCNC: 0.5 MG/DL (ref 0.1–1.2)
BILIRUB UR QL STRIP: NEGATIVE
BUN BLD-MCNC: 13 MG/DL (ref 6–20)
BUN/CREAT SERPL: 11.5 (ref 7–25)
CALCIUM SPEC-SCNC: 9.6 MG/DL (ref 8.6–10.5)
CHLORIDE SERPL-SCNC: 103 MMOL/L (ref 98–107)
CLARITY UR: ABNORMAL
CO2 SERPL-SCNC: 26.7 MMOL/L (ref 22–29)
COLOR UR: YELLOW
CREAT BLD-MCNC: 1.13 MG/DL (ref 0.76–1.27)
DEPRECATED RDW RBC AUTO: 40.2 FL (ref 37–54)
EOSINOPHIL # BLD AUTO: 0.16 10*3/MM3 (ref 0–0.7)
EOSINOPHIL NFR BLD AUTO: 2.1 % (ref 0.3–6.2)
ERYTHROCYTE [DISTWIDTH] IN BLOOD BY AUTOMATED COUNT: 12.2 % (ref 11.5–14.5)
GFR SERPL CREATININE-BSD FRML MDRD: 70 ML/MIN/1.73
GLOBULIN UR ELPH-MCNC: 2.4 GM/DL
GLUCOSE BLD-MCNC: 124 MG/DL (ref 65–99)
GLUCOSE UR STRIP-MCNC: NEGATIVE MG/DL
HCT VFR BLD AUTO: 45.2 % (ref 40.4–52.2)
HGB BLD-MCNC: 14.9 G/DL (ref 13.7–17.6)
HGB UR QL STRIP.AUTO: NEGATIVE
HOLD SPECIMEN: NORMAL
HOLD SPECIMEN: NORMAL
IMM GRANULOCYTES # BLD: 0 10*3/MM3 (ref 0–0.03)
IMM GRANULOCYTES NFR BLD: 0 % (ref 0–0.5)
KETONES UR QL STRIP: ABNORMAL
LEUKOCYTE ESTERASE UR QL STRIP.AUTO: NEGATIVE
LIPASE SERPL-CCNC: 33 U/L (ref 13–60)
LYMPHOCYTES # BLD AUTO: 2.19 10*3/MM3 (ref 0.9–4.8)
LYMPHOCYTES NFR BLD AUTO: 29.4 % (ref 19.6–45.3)
MCH RBC QN AUTO: 29.9 PG (ref 27–32.7)
MCHC RBC AUTO-ENTMCNC: 33 G/DL (ref 32.6–36.4)
MCV RBC AUTO: 90.8 FL (ref 79.8–96.2)
MONOCYTES # BLD AUTO: 0.4 10*3/MM3 (ref 0.2–1.2)
MONOCYTES NFR BLD AUTO: 5.4 % (ref 5–12)
NEUTROPHILS # BLD AUTO: 4.68 10*3/MM3 (ref 1.9–8.1)
NEUTROPHILS NFR BLD AUTO: 62.7 % (ref 42.7–76)
NITRITE UR QL STRIP: NEGATIVE
PH UR STRIP.AUTO: 6 [PH] (ref 5–8)
PLATELET # BLD AUTO: 292 10*3/MM3 (ref 140–500)
PMV BLD AUTO: 10 FL (ref 6–12)
POTASSIUM BLD-SCNC: 4.3 MMOL/L (ref 3.5–5.2)
PROT SERPL-MCNC: 7.1 G/DL (ref 6–8.5)
PROT UR QL STRIP: NEGATIVE
RBC # BLD AUTO: 4.98 10*6/MM3 (ref 4.6–6)
SODIUM BLD-SCNC: 141 MMOL/L (ref 136–145)
SP GR UR STRIP: 1.02 (ref 1–1.03)
TROPONIN T SERPL-MCNC: <0.01 NG/ML (ref 0–0.03)
UROBILINOGEN UR QL STRIP: ABNORMAL
WBC NRBC COR # BLD: 7.46 10*3/MM3 (ref 4.5–10.7)
WHOLE BLOOD HOLD SPECIMEN: NORMAL
WHOLE BLOOD HOLD SPECIMEN: NORMAL

## 2018-12-09 PROCEDURE — 71046 X-RAY EXAM CHEST 2 VIEWS: CPT

## 2018-12-09 PROCEDURE — 99283 EMERGENCY DEPT VISIT LOW MDM: CPT

## 2018-12-09 PROCEDURE — 83690 ASSAY OF LIPASE: CPT | Performed by: PHYSICIAN ASSISTANT

## 2018-12-09 PROCEDURE — 80053 COMPREHEN METABOLIC PANEL: CPT | Performed by: PHYSICIAN ASSISTANT

## 2018-12-09 PROCEDURE — 81003 URINALYSIS AUTO W/O SCOPE: CPT | Performed by: PHYSICIAN ASSISTANT

## 2018-12-09 PROCEDURE — 96374 THER/PROPH/DIAG INJ IV PUSH: CPT

## 2018-12-09 PROCEDURE — 96375 TX/PRO/DX INJ NEW DRUG ADDON: CPT

## 2018-12-09 PROCEDURE — 93010 ELECTROCARDIOGRAM REPORT: CPT | Performed by: INTERNAL MEDICINE

## 2018-12-09 PROCEDURE — 85025 COMPLETE CBC W/AUTO DIFF WBC: CPT | Performed by: PHYSICIAN ASSISTANT

## 2018-12-09 PROCEDURE — 84484 ASSAY OF TROPONIN QUANT: CPT | Performed by: PHYSICIAN ASSISTANT

## 2018-12-09 PROCEDURE — 93005 ELECTROCARDIOGRAM TRACING: CPT | Performed by: PHYSICIAN ASSISTANT

## 2018-12-09 PROCEDURE — 25010000002 ONDANSETRON PER 1 MG: Performed by: PHYSICIAN ASSISTANT

## 2018-12-09 PROCEDURE — 73630 X-RAY EXAM OF FOOT: CPT

## 2018-12-09 PROCEDURE — 96361 HYDRATE IV INFUSION ADD-ON: CPT

## 2018-12-09 RX ORDER — SUCRALFATE 1 G/1
1 TABLET ORAL 4 TIMES DAILY
Qty: 120 TABLET | Refills: 0 | Status: SHIPPED | OUTPATIENT
Start: 2018-12-09 | End: 2019-01-08

## 2018-12-09 RX ORDER — COLCHICINE 0.6 MG/1
TABLET ORAL
Qty: 10 TABLET | Refills: 0 | Status: SHIPPED | OUTPATIENT
Start: 2018-12-09 | End: 2020-02-23

## 2018-12-09 RX ORDER — ONDANSETRON 2 MG/ML
4 INJECTION INTRAMUSCULAR; INTRAVENOUS ONCE
Status: COMPLETED | OUTPATIENT
Start: 2018-12-09 | End: 2018-12-09

## 2018-12-09 RX ORDER — ESOMEPRAZOLE MAGNESIUM 40 MG/1
40 CAPSULE, DELAYED RELEASE ORAL
COMMUNITY
End: 2018-12-09

## 2018-12-09 RX ORDER — ESOMEPRAZOLE MAGNESIUM 40 MG/1
40 CAPSULE, DELAYED RELEASE ORAL
Qty: 30 CAPSULE | Refills: 0 | Status: SHIPPED | OUTPATIENT
Start: 2018-12-09 | End: 2019-01-08

## 2018-12-09 RX ORDER — FAMOTIDINE 10 MG/ML
20 INJECTION, SOLUTION INTRAVENOUS ONCE
Status: COMPLETED | OUTPATIENT
Start: 2018-12-09 | End: 2018-12-09

## 2018-12-09 RX ORDER — LORAZEPAM 0.5 MG/1
0.5 TABLET ORAL 2 TIMES DAILY PRN
Qty: 10 TABLET | Refills: 0 | Status: SHIPPED | OUTPATIENT
Start: 2018-12-09 | End: 2020-02-23

## 2018-12-09 RX ADMIN — FAMOTIDINE 20 MG: 10 INJECTION INTRAVENOUS at 16:12

## 2018-12-09 RX ADMIN — ONDANSETRON 4 MG: 2 INJECTION INTRAMUSCULAR; INTRAVENOUS at 16:12

## 2018-12-09 RX ADMIN — SODIUM CHLORIDE, POTASSIUM CHLORIDE, SODIUM LACTATE AND CALCIUM CHLORIDE 1000 ML: 600; 310; 30; 20 INJECTION, SOLUTION INTRAVENOUS at 16:14

## 2018-12-09 NOTE — DISCHARGE INSTRUCTIONS
Avoid caffeine, smoking, alcohol.  Take the medications as prescribed.  Follow up as instructed with a primary care provider and a gastroenterologist.  Return to the ER for new or worsening symptoms.

## 2018-12-09 NOTE — ED NOTES
Patient requesting ativan, for anxiety.  Kathy JONAS notified.      Jean-Paul Eng RN  12/09/18 4853

## 2018-12-09 NOTE — ED TRIAGE NOTES
"Patient presents with epigastric pain and dark red stool that started two weeks ago.  Patient states, \"I have a history of ulcers and I have had a lot of stress over the past few months, and two weeks ago I noticed my stool was dark red.\"  Patient notes regular bowel movements over past two days. Notes intermittent nausea with no vomiting. Patient notes shortness of breath, breathing is even and unlabored.  Patient also notes right great toe pain, patient states, \"I noticed it yesterday and I think its gout, and its real sensitive to touch.\"  No discoloration noted to right great toe, no edema noted.  Pulses are strong and equal bilaterally.  NAD at this time.   "

## 2018-12-09 NOTE — ED PROVIDER NOTES
EMERGENCY DEPARTMENT ENCOUNTER    Room Number:  27/27  Date seen:  12/9/2018  Time seen: 3:53 PM  PCP: Elba Arizmendi APRN    HPI:  Chief complaint: Rectal bleeding/dark stool  Context:Rinku Bray is a 48 y.o. male who presents to the ED with c/o dark red stool and upper abd pain which began 10 days ago. The red stool resolved 2 days ago and has been normal since. The pt states that he has a h/o ulcers and gastritis and has previously been admitted for this problem and prescribed Nexium. He stopped taking the Nexium months ago, but began taking the leftovers of his prescription when these symptoms began again. The pt has not seen a GI physician as an outpatient.      The pt states that his R foot is sore, which he believes could be due to gout. Denies injury, fever. The pt has never had gout but his father did. The pt has taken 2-3 ibuprofen 3-4 times a day for his foot pain with minimal relief. He has no hx of diabetes.    Onset: Gradual  Location:Rectum  Radiation: None  Duration: 10 days, resolved 2 days ago  Timing: Constant  Character:Dark red stool  Aggravating Factors: Unknown  Alleviating Factors: Unknown  Severity: Moderate      ALLERGIES  Metformin and related    PAST MEDICAL HISTORY  Active Ambulatory Problems     Diagnosis Date Noted   • Vomiting 05/16/2018   • Weight loss 05/16/2018   • Constipation 05/16/2018   • Anxiety 05/16/2018   • Gastritis 05/17/2018     Resolved Ambulatory Problems     Diagnosis Date Noted   • No Resolved Ambulatory Problems     Past Medical History:   Diagnosis Date   • Anemia    • Stomach ulcer        PAST SURGICAL HISTORY  Past Surgical History:   Procedure Laterality Date   • BACK SURGERY         FAMILY HISTORY  History reviewed. No pertinent family history.    SOCIAL HISTORY  Social History     Socioeconomic History   • Marital status: Single     Spouse name: Not on file   • Number of children: Not on file   • Years of education: Not on file   • Highest education  level: Not on file   Social Needs   • Financial resource strain: Not on file   • Food insecurity - worry: Not on file   • Food insecurity - inability: Not on file   • Transportation needs - medical: Not on file   • Transportation needs - non-medical: Not on file   Occupational History   • Not on file   Tobacco Use   • Smoking status: Never Smoker   • Smokeless tobacco: Current User     Types: Chew   Substance and Sexual Activity   • Alcohol use: No   • Drug use: No   • Sexual activity: Defer   Other Topics Concern   • Not on file   Social History Narrative   • Not on file       REVIEW OF SYSTEMS  Review of Systems   Constitutional: Negative for chills and fever.   HENT: Negative.    Eyes: Negative.    Respiratory: Positive for shortness of breath (Mild).    Cardiovascular: Negative for chest pain.   Gastrointestinal: Positive for abdominal pain (Epigastric), blood in stool and nausea. Negative for diarrhea.   Genitourinary: Negative.    Musculoskeletal: Positive for myalgias (R foot). Negative for arthralgias, back pain and joint swelling.   Skin: Negative.    Neurological: Negative.    Psychiatric/Behavioral: Negative.        PHYSICAL EXAM  ED Triage Vitals [12/09/18 1512]   Temp Heart Rate Resp BP SpO2   97.6 °F (36.4 °C) 101 16 -- 100 %      Temp src Heart Rate Source Patient Position BP Location FiO2 (%)   Tympanic Monitor -- -- --     Physical Exam   Constitutional: He is oriented to person, place, and time and well-developed, well-nourished, and in no distress.   HENT:   Head: Normocephalic and atraumatic.   Right Ear: External ear normal.   Left Ear: External ear normal.   Nose: Nose normal.   Eyes: Conjunctivae are normal.   Neck: Normal range of motion.   Cardiovascular: Normal rate and regular rhythm.   Pulmonary/Chest: Effort normal and breath sounds normal.   Abdominal:   Normal bowel sounds  Soft  Epigastric tenderness  Nondistended  No rebound, guarding, or rigidity  No appreciable organomegaly  No CVA  tenderness     Musculoskeletal: Normal range of motion.   R foot is normal in appearance and he has mild tenderness in R MTP   Neurological: He is alert and oriented to person, place, and time.   Skin: Skin is warm and dry.   Psychiatric: Affect normal.   Nursing note and vitals reviewed.      LAB RESULTS  Recent Results (from the past 24 hour(s))   Comprehensive Metabolic Panel    Collection Time: 12/09/18  4:15 PM   Result Value Ref Range    Glucose 124 (H) 65 - 99 mg/dL    BUN 13 6 - 20 mg/dL    Creatinine 1.13 0.76 - 1.27 mg/dL    Sodium 141 136 - 145 mmol/L    Potassium 4.3 3.5 - 5.2 mmol/L    Chloride 103 98 - 107 mmol/L    CO2 26.7 22.0 - 29.0 mmol/L    Calcium 9.6 8.6 - 10.5 mg/dL    Total Protein 7.1 6.0 - 8.5 g/dL    Albumin 4.70 3.50 - 5.20 g/dL    ALT (SGPT) 11 1 - 41 U/L    AST (SGOT) 12 1 - 40 U/L    Alkaline Phosphatase 89 39 - 117 U/L    Total Bilirubin 0.5 0.1 - 1.2 mg/dL    eGFR Non African Amer 70 >60 mL/min/1.73    Globulin 2.4 gm/dL    A/G Ratio 2.0 g/dL    BUN/Creatinine Ratio 11.5 7.0 - 25.0    Anion Gap 11.3 mmol/L   Lipase    Collection Time: 12/09/18  4:15 PM   Result Value Ref Range    Lipase 33 13 - 60 U/L   Troponin    Collection Time: 12/09/18  4:15 PM   Result Value Ref Range    Troponin T <0.010 0.000 - 0.030 ng/mL   CBC Auto Differential    Collection Time: 12/09/18  4:15 PM   Result Value Ref Range    WBC 7.46 4.50 - 10.70 10*3/mm3    RBC 4.98 4.60 - 6.00 10*6/mm3    Hemoglobin 14.9 13.7 - 17.6 g/dL    Hematocrit 45.2 40.4 - 52.2 %    MCV 90.8 79.8 - 96.2 fL    MCH 29.9 27.0 - 32.7 pg    MCHC 33.0 32.6 - 36.4 g/dL    RDW 12.2 11.5 - 14.5 %    RDW-SD 40.2 37.0 - 54.0 fl    MPV 10.0 6.0 - 12.0 fL    Platelets 292 140 - 500 10*3/mm3    Neutrophil % 62.7 42.7 - 76.0 %    Lymphocyte % 29.4 19.6 - 45.3 %    Monocyte % 5.4 5.0 - 12.0 %    Eosinophil % 2.1 0.3 - 6.2 %    Basophil % 0.4 0.0 - 1.5 %    Immature Grans % 0.0 0.0 - 0.5 %    Neutrophils, Absolute 4.68 1.90 - 8.10 10*3/mm3     Lymphocytes, Absolute 2.19 0.90 - 4.80 10*3/mm3    Monocytes, Absolute 0.40 0.20 - 1.20 10*3/mm3    Eosinophils, Absolute 0.16 0.00 - 0.70 10*3/mm3    Basophils, Absolute 0.03 0.00 - 0.20 10*3/mm3    Immature Grans, Absolute 0.00 0.00 - 0.03 10*3/mm3   Light Blue Top    Collection Time: 12/09/18  4:15 PM   Result Value Ref Range    Extra Tube hold for add-on    Green Top (Gel)    Collection Time: 12/09/18  4:15 PM   Result Value Ref Range    Extra Tube Hold for add-ons.    Lavender Top    Collection Time: 12/09/18  4:15 PM   Result Value Ref Range    Extra Tube hold for add-on    Gold Top - SST    Collection Time: 12/09/18  4:15 PM   Result Value Ref Range    Extra Tube Hold for add-ons.        I ordered the above labs and reviewed the results    RADIOLOGY  XR Foot 3+ View Right   Preliminary Result   No acute process.          XR Chest 2 View   Preliminary Result   No acute process.              I ordered the above noted radiological studies and reviewed the images on the PACS system.      MEDICATIONS GIVEN IN ER  Medications   lactated ringers bolus 1,000 mL (1,000 mL Intravenous New Bag 12/9/18 1614)   famotidine (PEPCID) injection 20 mg (20 mg Intravenous Given 12/9/18 1612)   ondansetron (ZOFRAN) injection 4 mg (4 mg Intravenous Given 12/9/18 1612)       EKG  Interpreted by ED Physician    PROCEDURES  Procedures      PROGRESS AND CONSULTS    Progress Notes:       1602 Discussed that ibuprofen can exacerbate gastritis. Discussed that I will order lab work and blood work for further evaluation. The pt agrees with the plan and all questions were addressed.     1731 Rechecked the pt who is resting comfortably. Discussed the pt's labs and XRs which were unremarkable. Discussed that I am waiting on a UA. Discussed that I believe his R foot had gout, which has resolved due to the ibuprofen use, but likely exacerbated his gastritis. Discussed that I believe the pt's other symptoms are related to gastritis. Discussed  "the plan to discharge the pt with another prescription for Nexium and a new prescription for carafate as well as colchicine for possible gout. The pt agrees with the plan and all questions were addressed.     1743 Reviewed pt's history and workup with Dr. Tomas.  After a bedside evaluation; Dr Tomas agrees with the plan of care      Disposition vitals:  /93   Pulse 101   Temp 97.6 °F (36.4 °C) (Tympanic)   Resp 16   Ht 177.8 cm (70\")   Wt 95 kg (209 lb 8 oz)   SpO2 100%   BMI 30.06 kg/m²       DIAGNOSIS  Final diagnoses:   Upper abdominal pain   Gastritis without bleeding, unspecified chronicity, unspecified gastritis type   Anxiety   Disposition: Discharged.    Patient discharged in stable condition.    Reviewed implications of results, diagnosis, meds, responsibility to follow up, warning signs and symptoms of possible worsening, potential complications and reasons to return to ER, including new or worsening symptoms.    Patient/Family voiced understanding of above instructions.    Discussed plan for discharge, as there is no emergent indication for admission.  Pt/family is agreeable and understands need for follow up and repeat testing.  Pt is aware that discharge does not mean that nothing is wrong but it indicates no emergency is present and they must continue care with follow-up as given below or physician of their choice.       FOLLOW UP   No follow-up provider specified.    RX     Medication List      Changed    LORazepam 0.5 MG tablet  Commonly known as:  ATIVAN  Take 1 tablet by mouth 2 (Two) Times a Day As Needed for Anxiety.  What changed:    medication strength  how much to take  when to take this              Documentation assistance provided by edwige Gambino for Sheree Saavedra PA-C.  Information recorded by the scribtori was done at my direction and has been verified and validated by me.           Nunu Gambino  12/09/18 [id number]       Sheree Saavedra PA  12/11/18 1406    "

## 2018-12-09 NOTE — ED PROVIDER NOTES
Pt presents to the ED c/o dark red stool w/ associated abdominal pain X 2 weeks w/ hx of upper GI bleeds. Pt also c/o R toe pain and has been treating it with OTC Ibuprofen. Pt also reports some increased anxiety.    PHYSICAL EXAM  GENERAL: not distressed  HENT: nares patent  EYES: EOMI  NECK: FROM  CV: regular rhythm, regular rate  RESPIRATORY: normal effort  ABDOMEN: soft, mild epigastric tenderness  MUSCULOSKELETAL: no deformity  NEURO: alert, oriented X 3  SKIN: warm, dry    Vital signs and nursing notes reviewed.    LAB RESULTS AND RADIOLOGY  I have reviewed the patient's labs and imaging studies.    PROCEDURE    PROGRESS NOTES  1746  Spoke to midlevel provider Kathy Saavedra PA-C, about the pt. After performing my own physical exam, I agree w/ the plan of care. Offered pt to speak w/ behavioral health. He declined. Offered short-term rx for anxiety until they can get into a provider. Pt understands and agrees with the plan, all questions answered.    Attestation:    The TIM and I have discussed this patient's history, physical exam, and treatment plan.  I have reviewed the documentation and personally had a face to face interaction with the patient. I affirm the documentation and agree with the treatment and plan.  The attached note describes my personal findings.    Documentation assistance provided by edwige Fernandes for Dr. Tomas. Information recorded by the scribe was done at my direction and has been verified and validated by me.     Desi Fernandes  12/09/18 9449       José Tomas MD  12/09/18 5114

## 2018-12-10 ENCOUNTER — EPISODE CHANGES (OUTPATIENT)
Dept: CASE MANAGEMENT | Facility: OTHER | Age: 48
End: 2018-12-10

## 2018-12-11 ENCOUNTER — EPISODE CHANGES (OUTPATIENT)
Dept: CASE MANAGEMENT | Facility: OTHER | Age: 48
End: 2018-12-11

## 2018-12-14 ENCOUNTER — EPISODE CHANGES (OUTPATIENT)
Dept: CASE MANAGEMENT | Facility: OTHER | Age: 48
End: 2018-12-14

## 2018-12-31 ENCOUNTER — PATIENT OUTREACH (OUTPATIENT)
Dept: CASE MANAGEMENT | Facility: OTHER | Age: 48
End: 2018-12-31

## 2018-12-31 ENCOUNTER — EPISODE CHANGES (OUTPATIENT)
Dept: CASE MANAGEMENT | Facility: OTHER | Age: 48
End: 2018-12-31

## 2018-12-31 NOTE — OUTREACH NOTE
Unable to reach patient/attempt x 3 regarding ED visit. Left voicemail with [facility] contact information.

## 2019-01-30 ENCOUNTER — PATIENT OUTREACH (OUTPATIENT)
Dept: CASE MANAGEMENT | Facility: OTHER | Age: 49
End: 2019-01-30

## 2019-01-30 NOTE — OUTREACH NOTE
Unable to reach patient / attempt x 4 for ED follow up. Left voicemail with [facility] contact information. Contacted listed PCP that [facility] unable to reach patient for ED follow up. Talked with Rain who states patient has not been in this office since 2014. No other physician office visits noted per Epic.

## 2019-02-05 ENCOUNTER — EPISODE CHANGES (OUTPATIENT)
Dept: CASE MANAGEMENT | Facility: OTHER | Age: 49
End: 2019-02-05

## 2020-02-23 ENCOUNTER — HOSPITAL ENCOUNTER (EMERGENCY)
Facility: HOSPITAL | Age: 50
Discharge: HOME | End: 2020-02-23
Attending: EMERGENCY MEDICINE | Admitting: EMERGENCY MEDICINE

## 2020-02-23 ENCOUNTER — APPOINTMENT (OUTPATIENT)
Dept: GENERAL RADIOLOGY | Facility: HOSPITAL | Age: 50
End: 2020-02-23

## 2020-02-23 VITALS
DIASTOLIC BLOOD PRESSURE: 89 MMHG | WEIGHT: 220 LBS | HEART RATE: 72 BPM | RESPIRATION RATE: 16 BRPM | BODY MASS INDEX: 30.8 KG/M2 | TEMPERATURE: 98.4 F | HEIGHT: 71 IN | OXYGEN SATURATION: 15 % | SYSTOLIC BLOOD PRESSURE: 161 MMHG

## 2020-02-23 DIAGNOSIS — M72.2 PLANTAR FASCIITIS: Primary | ICD-10-CM

## 2020-02-23 PROCEDURE — 73630 X-RAY EXAM OF FOOT: CPT

## 2020-02-23 PROCEDURE — 99283 EMERGENCY DEPT VISIT LOW MDM: CPT

## 2020-02-23 RX ORDER — OXYCODONE HYDROCHLORIDE AND ACETAMINOPHEN 5; 325 MG/1; MG/1
1 TABLET ORAL ONCE
Status: COMPLETED | OUTPATIENT
Start: 2020-02-23 | End: 2020-02-23

## 2020-02-23 RX ORDER — HYDROCODONE BITARTRATE AND ACETAMINOPHEN 5; 325 MG/1; MG/1
1 TABLET ORAL EVERY 6 HOURS PRN
Qty: 12 TABLET | Refills: 0 | Status: SHIPPED | OUTPATIENT
Start: 2020-02-23

## 2020-02-23 RX ADMIN — OXYCODONE HYDROCHLORIDE AND ACETAMINOPHEN 1 TABLET: 5; 325 TABLET ORAL at 17:34

## 2020-02-23 NOTE — ED NOTES
Pt to this ER reporting foot pain, 2-3 weeks ago pt was advised he had a bone spur that was affecting his achilles tendon. Pt stated that pain has been escalating, pt was placed in boot. Bearing weight was painful.   Pt was advised to come to ER if the pain worsened for an MRI.     Sarah Charles, MAMADOU  02/23/20 2492

## 2020-02-23 NOTE — DISCHARGE INSTRUCTIONS
Continue wearing the orthopedic boot on your left foot at all times except when at rest.  Use crutches and do not bear weight on your left foot.  Take medication as prescribed.  Follow-up with Dr. Kessler in 4 days as scheduled.  You may also follow-up with Dr. Mcneil of orthopedics if you prefer.  Continue taking your anti-inflammatory medications.

## 2020-02-23 NOTE — ED PROVIDER NOTES
EMERGENCY DEPARTMENT ENCOUNTER    CHIEF COMPLAINT  Chief Complaint: foot pain  History given by: patient  History limited by: nothing  Room Number: 26/26  PMD: Elba Arizmendi APRN      HPI:  Pt is a 49 y.o. male who presents to the ED via private vehicle complaining of gradual onset, constant, worsening left foot pain starting two-three weeks ago, secondary to bone spur to back of pt's heel that has damaged his [place]'s tendon. The pain is aggravated by ambulation, and somewhat improved at rest. Denies numbness or tingling. Pt was seen by Dr. Patel at [facility] last week, and instructed to come to ED for worsening pain and MRI for further evaluation. Pt was put in a walking boot a few weeks ago, but has not been using crutches. He was given Mobic and Meloxicam with no relief.    PAST MEDICAL HISTORY  Active Ambulatory Problems     Diagnosis Date Noted   • Vomiting 05/16/2018   • Weight loss 05/16/2018   • Constipation 05/16/2018   • Anxiety 05/16/2018   • Gastritis 05/17/2018     Resolved Ambulatory Problems     Diagnosis Date Noted   • No Resolved Ambulatory Problems     Past Medical History:   Diagnosis Date   • Anemia    • Stomach ulcer        PAST SURGICAL HISTORY  Past Surgical History:   Procedure Laterality Date   • BACK SURGERY     • ENDOSCOPY N/A 5/16/2018    Procedure: ESOPHAGOGASTRODUODENOSCOPY WITH COLD BIOPSIES;  Surgeon: Mike Monroy MD;  Location: [facility] END[facility];  Service: Gastroenterology       FAMILY HISTORY  History reviewed. No pertinent family history.    SOCIAL HISTORY  Social History     Socioeconomic History   • Marital status: Single     Spouse name: Not on file   • Number of children: Not on file   • Years of education: Not on file   • Highest education level: Not on file   Tobacco Use   • Smoking status: Never Smoker   • Smokeless tobacco: Current User     Types: Chew   Substance and Sexual Activity   • Alcohol use: No   • Drug use: No   • Sexual activity:  Defer       ALLERGIES  Metformin and related    REVIEW OF SYSTEMS  Review of Systems   Constitutional: Negative for fever.   HENT: Negative for sore throat.    Eyes: Negative.    Respiratory: Negative for cough.    Cardiovascular: Negative for leg swelling.   Gastrointestinal: Negative for abdominal pain and vomiting.   Endocrine: Negative.    Genitourinary: Negative for dysuria.   Musculoskeletal: Negative for neck pain.        (+) left foot pain   Skin: Negative for wound.   Allergic/Immunologic: Negative.    Neurological: Negative for weakness and numbness.   Hematological: Negative.    Psychiatric/Behavioral: Negative.    All other systems reviewed and are negative.    PHYSICAL EXAM  ED Triage Vitals [02/23/20 1548]   Temp Heart Rate Resp BP SpO2   98.4 °F (36.9 °C) 91 16 -- 100 %      Temp src Heart Rate Source Patient Position BP Location FiO2 (%)   Tympanic Monitor -- -- --       Physical Exam   Constitutional: No distress.   HENT:   Head: Normocephalic and atraumatic.   Eyes: EOM are normal.   Neck: Normal range of motion.   Cardiovascular: Normal rate, regular rhythm and normal heart sounds.   Pulmonary/Chest: No respiratory distress.   Abdominal: There is no tenderness.   Musculoskeletal: He exhibits no edema.   L foot was wrapped, so wrapping was removed  Tendernes to plantar aspect of heel and along the plantar fascia of the left foot  No erythema and normal pedal pulses in LLE  L ankle and calf non tender   Neurological: He is alert.   Skin: Skin is warm and dry.   Nursing note and vitals reviewed.      RADIOLOGY  XR Foot 3+ View Left   Final Result   Negative left foot radiographs.       This report was finalized on 2/23/2020 5:56 PM by Dr. Aniceto Morton M.D.               I ordered the above noted radiological studies. Interpreted by radiologist. Reviewed by me in PACS. See dictation for official radiology interpretation.      PROCEDURES  Procedures      PROGRESS AND CONSULTS    ED Course as of  Feb 23 1841   Sun Feb 23, 2020 1736 LUCI query complete (request #[id number]). Treatment plan to include limited course of prescribed  controlled substance. Risks including addiction, benefits, and alternatives presented to patient.       [WH]      ED Course User Index  [WH] Kana Mark MD     1648- Discussed with pt and family plan to obtain imaging of foot, as well as treat pain with XR L Foot. Pt understands and agrees with the plan, all questions answered. Ordered XR L Foot for further evaluation. Also ordered Percocet for symptom management.    1700- LUCI query complete. Treatment plan to include limited course of prescribed controlled substance. Risks including addiction, benefits, and alternatives presented to patient.     1810- Rechecked pt. Pt is resting comfortably. Notified pt of of negative acute imaging. Discussed the plan to discharge the pt home with prescriptions for pain medicine. I instructed the pt to follow up with PCP or Ortho for outpatient MRI for further evaluation. Pt understands and agrees with the plan, all questions answered.    MEDICAL DECISION MAKING  Results were reviewed/discussed with the patient and they were also made aware of online access. Pt also made aware that some labs, such as cultures, will not be resulted during ER visit and follow up with PMD is necessary.          DIAGNOSIS  Final diagnoses:   Plantar fasciitis       DISPOSITION  DISCHARGE    Patient discharged in stable condition.    Reviewed implications of results, diagnosis, meds, responsibility to follow up, warning signs and symptoms of possible worsening, potential complications and reasons to return to ER, including worsening symptoms.    Patient/Family voiced understanding of above instructions.    Discussed plan for discharge, as there is no emergent indication for admission. Patient referred to primary care provider for BP management due to today's BP. Pt/family is agreeable and understands need  for follow up and repeat testing.  Pt is aware that discharge does not mean that nothing is wrong but it indicates no emergency is present that requires admission and they must continue care with follow-up as given below or physician of their choice.     FOLLOW-UP  Adolfo Mcneil Jr., MD  [address]  [facility] [address]  [place] [id number] [phone]    Schedule an appointment as soon as possible for a visit   If symptoms persist    Ezra Kessler, DPM  [address]  [facility] [address] [phone]    Go in 4 days  As scheduled         Medication List      New Prescriptions    HYDROcodone-acetaminophen 5-325 MG per tablet  Commonly known as:  NORCO  Take 1 tablet by mouth Every 6 (Six) Hours As Needed for Moderate Pain .              Latest Documented Vital Signs:  As of 6:41 PM  BP- 161/89 HR- 72 Temp- 98.4 °F (36.9 °C) (Tympanic) O2 sat- (!) 15%    --  Documentation assistance provided by edwige Cosby for Dr. Mark.  Information recorded by the scribe was done at my direction and has been verified and validated by me.     Petty Cosby  02/23/20 [id number]       Kana Mark MD  02/23/20 0689

## 2020-02-23 NOTE — ED TRIAGE NOTES
[facility]  Told him to come in to hospital if the pain didn't ease up and get an mri

## 2020-09-24 ENCOUNTER — ON CAMPUS - OUTPATIENT (OUTPATIENT)
Dept: URBAN - METROPOLITAN AREA HOSPITAL 108 | Facility: HOSPITAL | Age: 50
End: 2020-09-24

## 2020-09-24 DIAGNOSIS — R11.2 NAUSEA WITH VOMITING, UNSPECIFIED: ICD-10-CM

## 2020-09-24 DIAGNOSIS — R93.3 ABNORMAL FINDINGS ON DIAGNOSTIC IMAGING OF OTHER PARTS OF DI: ICD-10-CM

## 2020-09-24 DIAGNOSIS — K63.3 ULCER OF INTESTINE: ICD-10-CM

## 2020-09-24 DIAGNOSIS — K29.50 UNSPECIFIED CHRONIC GASTRITIS WITHOUT BLEEDING: ICD-10-CM

## 2020-09-24 DIAGNOSIS — K52.9 NONINFECTIVE GASTROENTERITIS AND COLITIS, UNSPECIFIED: ICD-10-CM

## 2020-09-24 PROCEDURE — 45380 COLONOSCOPY AND BIOPSY: CPT

## 2020-09-24 PROCEDURE — 43239 EGD BIOPSY SINGLE/MULTIPLE: CPT

## 2020-10-06 ENCOUNTER — OFFICE (OUTPATIENT)
Dept: URBAN - METROPOLITAN AREA CLINIC 75 | Facility: CLINIC | Age: 50
End: 2020-10-06

## 2020-10-06 DIAGNOSIS — K52.9 NONINFECTIVE GASTROENTERITIS AND COLITIS, UNSPECIFIED: ICD-10-CM

## 2020-10-06 PROCEDURE — 99212 OFFICE O/P EST SF 10 MIN: CPT | Mod: 95

## 2020-10-06 RX ORDER — METOCLOPRAMIDE 5 MG/1
7.5 TABLET ORAL
Qty: 90 | Refills: 3 | Status: ACTIVE
Start: 2020-10-06

## (undated) DEVICE — CANN NASL CO2 TRULINK W/O2 A/

## (undated) DEVICE — Device: Brand: DEFENDO AIR/WATER/SUCTION AND BIOPSY VALVE

## (undated) DEVICE — BITEBLOCK OMNI BLOC

## (undated) DEVICE — TUBING, SUCTION, 1/4" X 10', STRAIGHT: Brand: MEDLINE

## (undated) DEVICE — FRCP BX RADJAW4 NDL 2.8 240CM LG OG BX40

## (undated) DEVICE — TBG 02 CRUSH RESIST LF CLR 7FT